# Patient Record
Sex: FEMALE | Race: WHITE | NOT HISPANIC OR LATINO | Employment: UNEMPLOYED | URBAN - METROPOLITAN AREA
[De-identification: names, ages, dates, MRNs, and addresses within clinical notes are randomized per-mention and may not be internally consistent; named-entity substitution may affect disease eponyms.]

---

## 2023-01-05 DIAGNOSIS — Z78.0 MENOPAUSE: Primary | ICD-10-CM

## 2023-01-09 DIAGNOSIS — Z12.31 ENCOUNTER FOR SCREENING MAMMOGRAM FOR MALIGNANT NEOPLASM OF BREAST: Primary | ICD-10-CM

## 2023-01-24 ENCOUNTER — HOSPITAL ENCOUNTER (OUTPATIENT)
Dept: RADIOLOGY | Facility: HOSPITAL | Age: 63
Discharge: HOME | End: 2023-01-24
Attending: SPECIALIST
Payer: COMMERCIAL

## 2023-01-24 DIAGNOSIS — Z12.31 ENCOUNTER FOR SCREENING MAMMOGRAM FOR MALIGNANT NEOPLASM OF BREAST: ICD-10-CM

## 2023-01-24 PROCEDURE — 77067 SCR MAMMO BI INCL CAD: CPT | Mod: TC,PO

## 2023-11-07 DIAGNOSIS — K22.11 ESOPHAGEAL ULCER WITH BLEEDING: ICD-10-CM

## 2023-11-07 DIAGNOSIS — K22.4 ESOPHAGEAL DYSMOTILITY: Primary | ICD-10-CM

## 2023-12-26 ENCOUNTER — HOSPITAL ENCOUNTER (OUTPATIENT)
Dept: RADIOLOGY | Facility: HOSPITAL | Age: 63
Discharge: HOME | End: 2023-12-26
Attending: INTERNAL MEDICINE
Payer: COMMERCIAL

## 2023-12-26 DIAGNOSIS — K22.4 ESOPHAGEAL DYSMOTILITY: ICD-10-CM

## 2023-12-26 DIAGNOSIS — K22.11 ESOPHAGEAL ULCER WITH BLEEDING: ICD-10-CM

## 2023-12-26 PROCEDURE — 74220 X-RAY XM ESOPHAGUS 1CNTRST: CPT | Mod: TC

## 2023-12-26 PROCEDURE — 74220 FL ESOPHAGRAM WITH BARIUM TABLET: ICD-10-PCS | Mod: 26,,, | Performed by: RADIOLOGY

## 2023-12-26 PROCEDURE — 74220 X-RAY XM ESOPHAGUS 1CNTRST: CPT | Mod: 26,,, | Performed by: RADIOLOGY

## 2023-12-26 PROCEDURE — 25500020 PHARM REV CODE 255

## 2023-12-26 PROCEDURE — A9698 NON-RAD CONTRAST MATERIALNOC: HCPCS

## 2023-12-26 RX ADMIN — BARIUM SULFATE 355 ML: 0.6 SUSPENSION ORAL at 11:12

## 2024-02-25 ENCOUNTER — OFFICE VISIT (OUTPATIENT)
Dept: URGENT CARE | Facility: CLINIC | Age: 64
End: 2024-02-25
Payer: COMMERCIAL

## 2024-02-25 ENCOUNTER — HOSPITAL ENCOUNTER (INPATIENT)
Facility: HOSPITAL | Age: 64
LOS: 1 days | Discharge: HOME | DRG: 177 | End: 2024-02-28
Attending: EMERGENCY MEDICINE | Admitting: STUDENT IN AN ORGANIZED HEALTH CARE EDUCATION/TRAINING PROGRAM
Payer: COMMERCIAL

## 2024-02-25 VITALS
BODY MASS INDEX: 28.03 KG/M2 | WEIGHT: 174.38 LBS | SYSTOLIC BLOOD PRESSURE: 157 MMHG | RESPIRATION RATE: 22 BRPM | DIASTOLIC BLOOD PRESSURE: 86 MMHG | TEMPERATURE: 98 F | HEART RATE: 94 BPM | OXYGEN SATURATION: 92 % | HEIGHT: 66 IN

## 2024-02-25 DIAGNOSIS — J96.01 ACUTE RESPIRATORY FAILURE WITH HYPOXIA: Primary | ICD-10-CM

## 2024-02-25 DIAGNOSIS — U07.1 COVID-19: ICD-10-CM

## 2024-02-25 DIAGNOSIS — R09.81 NASAL CONGESTION: ICD-10-CM

## 2024-02-25 DIAGNOSIS — U07.1 COVID-19 VIRUS INFECTION: ICD-10-CM

## 2024-02-25 DIAGNOSIS — R05.9 COUGH, UNSPECIFIED TYPE: Primary | ICD-10-CM

## 2024-02-25 DIAGNOSIS — Z87.09 HISTORY OF ASTHMA: ICD-10-CM

## 2024-02-25 DIAGNOSIS — R06.02 SOB (SHORTNESS OF BREATH): ICD-10-CM

## 2024-02-25 PROBLEM — Z90.3 H/O GASTRIC SLEEVE: Status: ACTIVE | Noted: 2023-10-23

## 2024-02-25 PROBLEM — J42 UNSPECIFIED CHRONIC BRONCHITIS: Status: ACTIVE | Noted: 2024-02-25

## 2024-02-25 PROBLEM — K21.9 GASTROESOPHAGEAL REFLUX DISEASE WITHOUT ESOPHAGITIS: Status: ACTIVE | Noted: 2023-10-23

## 2024-02-25 PROBLEM — J45.20 MILD INTERMITTENT ASTHMA WITHOUT COMPLICATION: Status: ACTIVE | Noted: 2023-10-23

## 2024-02-25 PROBLEM — E87.6 HYPOKALEMIA: Status: ACTIVE | Noted: 2024-02-25

## 2024-02-25 LAB
25(OH)D3+25(OH)D2 SERPL-MCNC: 39 NG/ML (ref 30–96)
ALBUMIN SERPL BCP-MCNC: 4.3 G/DL (ref 3.5–5.2)
ALP SERPL-CCNC: 77 U/L (ref 55–135)
ALT SERPL W/O P-5'-P-CCNC: 27 U/L (ref 10–44)
ANION GAP SERPL CALC-SCNC: 14 MMOL/L (ref 8–16)
APTT PPP: 28.8 SEC (ref 21–32)
AST SERPL-CCNC: 18 U/L (ref 10–40)
BACTERIA #/AREA URNS HPF: NORMAL /HPF
BASOPHILS # BLD AUTO: 0.07 K/UL (ref 0–0.2)
BASOPHILS NFR BLD: 0.5 % (ref 0–1.9)
BILIRUB SERPL-MCNC: 0.5 MG/DL (ref 0.1–1)
BILIRUB UR QL STRIP: NEGATIVE
BNP SERPL-MCNC: 30 PG/ML (ref 0–99)
BUN SERPL-MCNC: 6 MG/DL (ref 8–23)
CALCIUM SERPL-MCNC: 10.1 MG/DL (ref 8.7–10.5)
CHLORIDE SERPL-SCNC: 107 MMOL/L (ref 95–110)
CK SERPL-CCNC: 147 U/L (ref 20–180)
CLARITY UR: CLEAR
CO2 SERPL-SCNC: 22 MMOL/L (ref 23–29)
COLOR UR: YELLOW
CREAT SERPL-MCNC: 0.8 MG/DL (ref 0.5–1.4)
CRP SERPL-MCNC: 9 MG/L (ref 0–8.2)
D DIMER PPP IA.FEU-MCNC: 0.25 MG/L FEU
DIFFERENTIAL METHOD BLD: ABNORMAL
EOSINOPHIL # BLD AUTO: 0.1 K/UL (ref 0–0.5)
EOSINOPHIL NFR BLD: 0.8 % (ref 0–8)
ERYTHROCYTE [DISTWIDTH] IN BLOOD BY AUTOMATED COUNT: 13.2 % (ref 11.5–14.5)
ERYTHROCYTE [SEDIMENTATION RATE] IN BLOOD BY WESTERGREN METHOD: 5 MM/HR (ref 0–20)
EST. GFR  (NO RACE VARIABLE): >60 ML/MIN/1.73 M^2
FERRITIN SERPL-MCNC: 10 NG/ML (ref 20–300)
GLUCOSE SERPL-MCNC: 101 MG/DL (ref 70–110)
GLUCOSE UR QL STRIP: NEGATIVE
HCT VFR BLD AUTO: 46.1 % (ref 37–48.5)
HGB BLD-MCNC: 15.3 G/DL (ref 12–16)
HGB UR QL STRIP: ABNORMAL
IMM GRANULOCYTES # BLD AUTO: 0.04 K/UL (ref 0–0.04)
IMM GRANULOCYTES NFR BLD AUTO: 0.3 % (ref 0–0.5)
INFLUENZA A, MOLECULAR: NEGATIVE
INFLUENZA B, MOLECULAR: NEGATIVE
INR PPP: 1 (ref 0.8–1.2)
KETONES UR QL STRIP: NEGATIVE
LACTATE SERPL-SCNC: 2 MMOL/L (ref 0.5–2.2)
LDH SERPL L TO P-CCNC: 379 U/L (ref 110–260)
LEUKOCYTE ESTERASE UR QL STRIP: ABNORMAL
LYMPHOCYTES # BLD AUTO: 1.2 K/UL (ref 1–4.8)
LYMPHOCYTES NFR BLD: 8.4 % (ref 18–48)
MCH RBC QN AUTO: 31.2 PG (ref 27–31)
MCHC RBC AUTO-ENTMCNC: 33.2 G/DL (ref 32–36)
MCV RBC AUTO: 94 FL (ref 82–98)
MICROSCOPIC COMMENT: NORMAL
MONOCYTES # BLD AUTO: 0.9 K/UL (ref 0.3–1)
MONOCYTES NFR BLD: 6.6 % (ref 4–15)
NEUTROPHILS # BLD AUTO: 11.8 K/UL (ref 1.8–7.7)
NEUTROPHILS NFR BLD: 83.4 % (ref 38–73)
NITRITE UR QL STRIP: NEGATIVE
NRBC BLD-RTO: 0 /100 WBC
PH UR STRIP: 7 [PH] (ref 5–8)
PLATELET # BLD AUTO: 339 K/UL (ref 150–450)
PMV BLD AUTO: 9.9 FL (ref 9.2–12.9)
POTASSIUM SERPL-SCNC: 3.2 MMOL/L (ref 3.5–5.1)
PROT SERPL-MCNC: 7.7 G/DL (ref 6–8.4)
PROT UR QL STRIP: NEGATIVE
PROTHROMBIN TIME: 10.4 SEC (ref 9–12.5)
RBC # BLD AUTO: 4.9 M/UL (ref 4–5.4)
RBC #/AREA URNS HPF: 2 /HPF (ref 0–4)
SARS-COV-2 RDRP RESP QL NAA+PROBE: NEGATIVE
SODIUM SERPL-SCNC: 143 MMOL/L (ref 136–145)
SP GR UR STRIP: 1.01 (ref 1–1.03)
SPECIMEN SOURCE: NORMAL
SQUAMOUS #/AREA URNS HPF: 3 /HPF
TROPONIN I SERPL DL<=0.01 NG/ML-MCNC: 0.01 NG/ML (ref 0–0.03)
URN SPEC COLLECT METH UR: ABNORMAL
UROBILINOGEN UR STRIP-ACNC: NEGATIVE EU/DL
WBC # BLD AUTO: 14.19 K/UL (ref 3.9–12.7)
WBC #/AREA URNS HPF: 3 /HPF (ref 0–5)

## 2024-02-25 PROCEDURE — 87502 INFLUENZA DNA AMP PROBE: CPT

## 2024-02-25 PROCEDURE — 84484 ASSAY OF TROPONIN QUANT: CPT | Performed by: NURSE PRACTITIONER

## 2024-02-25 PROCEDURE — 83880 ASSAY OF NATRIURETIC PEPTIDE: CPT

## 2024-02-25 PROCEDURE — U0002 COVID-19 LAB TEST NON-CDC: HCPCS | Performed by: NURSE PRACTITIONER

## 2024-02-25 PROCEDURE — 96375 TX/PRO/DX INJ NEW DRUG ADDON: CPT

## 2024-02-25 PROCEDURE — 36415 COLL VENOUS BLD VENIPUNCTURE: CPT

## 2024-02-25 PROCEDURE — 82306 VITAMIN D 25 HYDROXY: CPT

## 2024-02-25 PROCEDURE — 93005 ELECTROCARDIOGRAM TRACING: CPT

## 2024-02-25 PROCEDURE — 99900031 HC PATIENT EDUCATION (STAT)

## 2024-02-25 PROCEDURE — 25000003 PHARM REV CODE 250: Performed by: NURSE PRACTITIONER

## 2024-02-25 PROCEDURE — 85730 THROMBOPLASTIN TIME PARTIAL: CPT

## 2024-02-25 PROCEDURE — 36415 COLL VENOUS BLD VENIPUNCTURE: CPT | Performed by: NURSE PRACTITIONER

## 2024-02-25 PROCEDURE — 63600175 PHARM REV CODE 636 W HCPCS: Mod: JZ,TB

## 2024-02-25 PROCEDURE — 82550 ASSAY OF CK (CPK): CPT | Performed by: NURSE PRACTITIONER

## 2024-02-25 PROCEDURE — 94640 AIRWAY INHALATION TREATMENT: CPT | Mod: XB

## 2024-02-25 PROCEDURE — 27000221 HC OXYGEN, UP TO 24 HOURS

## 2024-02-25 PROCEDURE — 25000003 PHARM REV CODE 250

## 2024-02-25 PROCEDURE — 96365 THER/PROPH/DIAG IV INF INIT: CPT

## 2024-02-25 PROCEDURE — 94640 AIRWAY INHALATION TREATMENT: CPT

## 2024-02-25 PROCEDURE — 80053 COMPREHEN METABOLIC PANEL: CPT | Performed by: NURSE PRACTITIONER

## 2024-02-25 PROCEDURE — 85379 FIBRIN DEGRADATION QUANT: CPT

## 2024-02-25 PROCEDURE — 93010 ELECTROCARDIOGRAM REPORT: CPT | Mod: ,,, | Performed by: GENERAL PRACTICE

## 2024-02-25 PROCEDURE — 85025 COMPLETE CBC W/AUTO DIFF WBC: CPT | Performed by: NURSE PRACTITIONER

## 2024-02-25 PROCEDURE — 25000242 PHARM REV CODE 250 ALT 637 W/ HCPCS

## 2024-02-25 PROCEDURE — 82728 ASSAY OF FERRITIN: CPT | Performed by: NURSE PRACTITIONER

## 2024-02-25 PROCEDURE — G0378 HOSPITAL OBSERVATION PER HR: HCPCS

## 2024-02-25 PROCEDURE — 99204 OFFICE O/P NEW MOD 45 MIN: CPT | Mod: S$GLB,,, | Performed by: NURSE PRACTITIONER

## 2024-02-25 PROCEDURE — 85610 PROTHROMBIN TIME: CPT

## 2024-02-25 PROCEDURE — 85651 RBC SED RATE NONAUTOMATED: CPT

## 2024-02-25 PROCEDURE — 99285 EMERGENCY DEPT VISIT HI MDM: CPT | Mod: 25

## 2024-02-25 PROCEDURE — 63600175 PHARM REV CODE 636 W HCPCS: Performed by: NURSE PRACTITIONER

## 2024-02-25 PROCEDURE — 83605 ASSAY OF LACTIC ACID: CPT | Performed by: NURSE PRACTITIONER

## 2024-02-25 PROCEDURE — 25000242 PHARM REV CODE 250 ALT 637 W/ HCPCS: Performed by: EMERGENCY MEDICINE

## 2024-02-25 PROCEDURE — 94761 N-INVAS EAR/PLS OXIMETRY MLT: CPT

## 2024-02-25 PROCEDURE — 81000 URINALYSIS NONAUTO W/SCOPE: CPT

## 2024-02-25 PROCEDURE — 86140 C-REACTIVE PROTEIN: CPT | Performed by: NURSE PRACTITIONER

## 2024-02-25 PROCEDURE — 83615 LACTATE (LD) (LDH) ENZYME: CPT | Performed by: NURSE PRACTITIONER

## 2024-02-25 RX ORDER — KETOROLAC TROMETHAMINE 30 MG/ML
15 INJECTION, SOLUTION INTRAMUSCULAR; INTRAVENOUS EVERY 6 HOURS PRN
Status: DISCONTINUED | OUTPATIENT
Start: 2024-02-26 | End: 2024-02-28 | Stop reason: HOSPADM

## 2024-02-25 RX ORDER — DEXTROMETHORPHAN HBR, PHENYLEPHRINE HCL, PYRILAMINE MALEATE 7.5; 5; 12.5 MG/5ML; MG/5ML; MG/5ML
7 SYRUP ORAL 3 TIMES DAILY PRN
COMMUNITY
Start: 2024-02-18

## 2024-02-25 RX ORDER — GLUCAGON 1 MG
1 KIT INJECTION
Status: DISCONTINUED | OUTPATIENT
Start: 2024-02-25 | End: 2024-02-28 | Stop reason: HOSPADM

## 2024-02-25 RX ORDER — PREDNISONE 20 MG/1
60 TABLET ORAL
Status: DISCONTINUED | OUTPATIENT
Start: 2024-02-25 | End: 2024-02-25

## 2024-02-25 RX ORDER — ACETAMINOPHEN 325 MG/1
650 TABLET ORAL EVERY 4 HOURS PRN
Status: DISCONTINUED | OUTPATIENT
Start: 2024-02-25 | End: 2024-02-28 | Stop reason: HOSPADM

## 2024-02-25 RX ORDER — ENOXAPARIN SODIUM 100 MG/ML
40 INJECTION SUBCUTANEOUS EVERY 24 HOURS
Status: DISCONTINUED | OUTPATIENT
Start: 2024-02-26 | End: 2024-02-28 | Stop reason: HOSPADM

## 2024-02-25 RX ORDER — OLOPATADINE HYDROCHLORIDE AND MOMETASONE FUROATE 25; 665 UG/1; UG/1
2 SPRAY, METERED NASAL 2 TIMES DAILY
COMMUNITY
Start: 2024-02-09

## 2024-02-25 RX ORDER — SODIUM CHLORIDE 0.9 % (FLUSH) 0.9 %
10 SYRINGE (ML) INJECTION
Status: DISCONTINUED | OUTPATIENT
Start: 2024-02-25 | End: 2024-02-28 | Stop reason: HOSPADM

## 2024-02-25 RX ORDER — IPRATROPIUM BROMIDE AND ALBUTEROL SULFATE 2.5; .5 MG/3ML; MG/3ML
3 SOLUTION RESPIRATORY (INHALATION) EVERY 6 HOURS
Status: DISCONTINUED | OUTPATIENT
Start: 2024-02-26 | End: 2024-02-28 | Stop reason: HOSPADM

## 2024-02-25 RX ORDER — IPRATROPIUM BROMIDE AND ALBUTEROL SULFATE 2.5; .5 MG/3ML; MG/3ML
3 SOLUTION RESPIRATORY (INHALATION)
Status: DISCONTINUED | OUTPATIENT
Start: 2024-02-25 | End: 2024-02-25

## 2024-02-25 RX ORDER — POTASSIUM CHLORIDE 20 MEQ/1
20 TABLET, EXTENDED RELEASE ORAL ONCE
Status: COMPLETED | OUTPATIENT
Start: 2024-02-25 | End: 2024-02-25

## 2024-02-25 RX ORDER — ASCORBIC ACID 500 MG
500 TABLET ORAL 2 TIMES DAILY
Status: DISCONTINUED | OUTPATIENT
Start: 2024-02-25 | End: 2024-02-28 | Stop reason: HOSPADM

## 2024-02-25 RX ORDER — ONDANSETRON HYDROCHLORIDE 2 MG/ML
8 INJECTION, SOLUTION INTRAVENOUS EVERY 8 HOURS PRN
Status: DISCONTINUED | OUTPATIENT
Start: 2024-02-25 | End: 2024-02-28 | Stop reason: HOSPADM

## 2024-02-25 RX ORDER — IBUPROFEN 200 MG
16 TABLET ORAL
Status: DISCONTINUED | OUTPATIENT
Start: 2024-02-25 | End: 2024-02-28 | Stop reason: HOSPADM

## 2024-02-25 RX ORDER — OMEPRAZOLE 40 MG/1
40 CAPSULE, DELAYED RELEASE ORAL EVERY MORNING
COMMUNITY

## 2024-02-25 RX ORDER — ZOLPIDEM TARTRATE 10 MG/1
1 TABLET ORAL NIGHTLY
COMMUNITY
Start: 2024-02-15

## 2024-02-25 RX ORDER — TALC
9 POWDER (GRAM) TOPICAL NIGHTLY PRN
Status: DISCONTINUED | OUTPATIENT
Start: 2024-02-25 | End: 2024-02-28 | Stop reason: HOSPADM

## 2024-02-25 RX ORDER — SUCRALFATE 1 G/1
1 TABLET ORAL 3 TIMES DAILY
COMMUNITY
Start: 2023-12-27

## 2024-02-25 RX ORDER — IBUPROFEN 200 MG
24 TABLET ORAL
Status: DISCONTINUED | OUTPATIENT
Start: 2024-02-25 | End: 2024-02-28 | Stop reason: HOSPADM

## 2024-02-25 RX ORDER — PANTOPRAZOLE SODIUM 40 MG/1
40 TABLET, DELAYED RELEASE ORAL DAILY
Status: DISCONTINUED | OUTPATIENT
Start: 2024-02-26 | End: 2024-02-28 | Stop reason: HOSPADM

## 2024-02-25 RX ORDER — SIMVASTATIN 20 MG/1
20 TABLET, FILM COATED ORAL NIGHTLY
COMMUNITY

## 2024-02-25 RX ORDER — GABAPENTIN 300 MG/1
300 CAPSULE ORAL 2 TIMES DAILY
Status: DISCONTINUED | OUTPATIENT
Start: 2024-02-25 | End: 2024-02-28 | Stop reason: HOSPADM

## 2024-02-25 RX ORDER — ALBUTEROL SULFATE 90 UG/1
2 AEROSOL, METERED RESPIRATORY (INHALATION) 4 TIMES DAILY PRN
COMMUNITY

## 2024-02-25 RX ORDER — NIRMATRELVIR AND RITONAVIR 300-100 MG
KIT ORAL
COMMUNITY
Start: 2024-02-22

## 2024-02-25 RX ORDER — GABAPENTIN 300 MG/1
300 CAPSULE ORAL 2 TIMES DAILY
COMMUNITY

## 2024-02-25 RX ORDER — ALBUTEROL SULFATE 0.83 MG/ML
2.5 SOLUTION RESPIRATORY (INHALATION) 4 TIMES DAILY
Status: ON HOLD | COMMUNITY
Start: 2024-01-12 | End: 2024-02-28

## 2024-02-25 RX ORDER — IPRATROPIUM BROMIDE AND ALBUTEROL SULFATE 2.5; .5 MG/3ML; MG/3ML
3 SOLUTION RESPIRATORY (INHALATION)
Status: COMPLETED | OUTPATIENT
Start: 2024-02-25 | End: 2024-02-25

## 2024-02-25 RX ADMIN — REMDESIVIR 200 MG: 100 INJECTION, POWDER, LYOPHILIZED, FOR SOLUTION INTRAVENOUS at 07:02

## 2024-02-25 RX ADMIN — KETOROLAC TROMETHAMINE 15 MG: 30 INJECTION INTRAMUSCULAR; INTRAVENOUS at 11:02

## 2024-02-25 RX ADMIN — IPRATROPIUM BROMIDE AND ALBUTEROL SULFATE 3 ML: .5; 2.5 SOLUTION RESPIRATORY (INHALATION) at 02:02

## 2024-02-25 RX ADMIN — POTASSIUM BICARBONATE 20 MEQ: 391 TABLET, EFFERVESCENT ORAL at 05:02

## 2024-02-25 RX ADMIN — POTASSIUM CHLORIDE 20 MEQ: 1500 TABLET, EXTENDED RELEASE ORAL at 08:02

## 2024-02-25 RX ADMIN — ACETAMINOPHEN 650 MG: 325 TABLET ORAL at 07:02

## 2024-02-25 RX ADMIN — OXYCODONE HYDROCHLORIDE AND ACETAMINOPHEN 500 MG: 500 TABLET ORAL at 08:02

## 2024-02-25 RX ADMIN — METHYLPREDNISOLONE SODIUM SUCCINATE 80 MG: 40 INJECTION, POWDER, FOR SOLUTION INTRAMUSCULAR; INTRAVENOUS at 03:02

## 2024-02-25 RX ADMIN — GABAPENTIN 300 MG: 300 CAPSULE ORAL at 11:02

## 2024-02-25 RX ADMIN — IPRATROPIUM BROMIDE AND ALBUTEROL SULFATE 3 ML: .5; 2.5 SOLUTION RESPIRATORY (INHALATION) at 05:02

## 2024-02-25 NOTE — ED PROVIDER NOTES
Encounter Date: 2/25/2024       History     Chief Complaint   Patient presents with    Shortness of Breath    Cough     Patient is a 63 y.o. female who presents to the ED 02/25/2024 who underwent emergent evaluation for SOB that started today. Pt tested positive for COVID 19 on Thursday of this week and started on paxlovid. She states her symptoms started Sunday 1 week ago and she went to urgent care where they gave her antibiotics and steroids but she did not take them bc she woke up feeling better the next day. She then traveled to another state to be  with her mother in the hospital who was dying of covid 19. When she came back she tested positive               Review of patient's allergies indicates:   Allergen Reactions    Sulfa (sulfonamide antibiotics) Rash     UNSURE    Sulfamethoxazole-trimethoprim Rash     UNSURE     Past Medical History:   Diagnosis Date    Asthma     GERD (gastroesophageal reflux disease)     Mixed hyperlipidemia      Past Surgical History:   Procedure Laterality Date    BARIATRIC SURGERY      gastric sleeve     History reviewed. No pertinent family history.  Social History     Tobacco Use    Smoking status: Unknown   Substance Use Topics    Alcohol use: Yes     Comment: social    Drug use: Never     Review of Systems    Physical Exam     Initial Vitals [02/25/24 1247]   BP Pulse Resp Temp SpO2   124/75 95 20 97.6 °F (36.4 °C) 96 %      MAP       --         Physical Exam    ED Course   Procedures  Labs Reviewed   CBC W/ AUTO DIFFERENTIAL - Abnormal; Notable for the following components:       Result Value    WBC 14.19 (*)     MCH 31.2 (*)     Gran # (ANC) 11.8 (*)     Gran % 83.4 (*)     Lymph % 8.4 (*)     All other components within normal limits   COMPREHENSIVE METABOLIC PANEL - Abnormal; Notable for the following components:    Potassium 3.2 (*)     CO2 22 (*)     BUN 6 (*)     All other components within normal limits   C-REACTIVE PROTEIN - Abnormal; Notable for the following  components:    CRP 9.0 (*)     All other components within normal limits   FERRITIN - Abnormal; Notable for the following components:    Ferritin 10 (*)     All other components within normal limits   LACTATE DEHYDROGENASE - Abnormal; Notable for the following components:     (*)     All other components within normal limits   URINALYSIS, REFLEX TO URINE CULTURE - Abnormal; Notable for the following components:    Occult Blood UA Trace (*)     Leukocytes, UA 1+ (*)     All other components within normal limits    Narrative:     Specimen Source->Urine   INFLUENZA A & B BY MOLECULAR   CK   LACTIC ACID, PLASMA   TROPONIN I   SARS-COV-2 RNA AMPLIFICATION, QUAL   VITAMIN D   PROTIME-INR   APTT   SEDIMENTATION RATE   D DIMER, QUANTITATIVE   B-TYPE NATRIURETIC PEPTIDE   URINALYSIS MICROSCOPIC    Narrative:     Specimen Source->Urine     EKG Readings: (Independently Interpreted)   Initial Reading: No STEMI. Rhythm: Normal Sinus Rhythm. Heart Rate: 92. Ectopy: No Ectopy. Conduction: Normal. ST Segments: Normal ST Segments. T Waves: Normal. Clinical Impression: Normal Sinus Rhythm       Imaging Results              X-Ray Chest PA And Lateral (Final result)  Result time 02/25/24 13:20:33      Final result by Corinna Angel MD (02/25/24 13:20:33)                   Impression:      No acute abnormality.      Electronically signed by: Corinna Angel  Date:    02/25/2024  Time:    13:20               Narrative:    EXAMINATION:  XR CHEST PA AND LATERAL    CLINICAL HISTORY:  Asthma;    TECHNIQUE:  PA and lateral views of the chest were performed.    COMPARISON:  None    FINDINGS:  The lungs are clear, with normal appearance of pulmonary vasculature and no pleural effusion or pneumothorax.    The cardiac silhouette is normal in size. The hilar and mediastinal contours are unremarkable.    Bones are intact.                                       Medications   sodium chloride 0.9% flush 10 mL (has no administration in  time range)   remdesivir 100 mg in sodium chloride 0.9% 250 mL infusion (has no administration in time range)   glucose chewable tablet 16 g (has no administration in time range)   glucose chewable tablet 24 g (has no administration in time range)   glucagon (human recombinant) injection 1 mg (has no administration in time range)   ascorbic acid (vitamin C) tablet 500 mg (has no administration in time range)   multivitamin tablet (has no administration in time range)   dexAMETHasone tablet 6 mg (has no administration in time range)   ondansetron injection 8 mg (has no administration in time range)   acetaminophen tablet 650 mg (650 mg Oral Given 2/25/24 1921)   enoxaparin injection 40 mg (has no administration in time range)   albuterol-ipratropium 2.5 mg-0.5 mg/3 mL nebulizer solution 3 mL (3 mLs Nebulization Given 2/25/24 1745)   melatonin tablet 9 mg (has no administration in time range)   dextrose 10% bolus 125 mL 125 mL (has no administration in time range)   dextrose 10% bolus 250 mL 250 mL (has no administration in time range)   potassium chloride SA CR tablet 20 mEq (has no administration in time range)   methylPREDNISolone sodium succinate injection 80 mg (80 mg Intravenous Given 2/25/24 1530)   albuterol-ipratropium 2.5 mg-0.5 mg/3 mL nebulizer solution 3 mL (3 mLs Nebulization Given 2/25/24 1400)   potassium bicarbonate disintegrating tablet 20 mEq (20 mEq Oral Given 2/25/24 1704)   remdesivir 200 mg in sodium chloride 0.9% 250 mL infusion (0 mg Intravenous Stopped 2/25/24 1941)     Medical Decision Making  Amount and/or Complexity of Data Reviewed  Labs: ordered.  Radiology:  Decision-making details documented in ED Course.    Risk  Prescription drug management.         APC / Resident Notes:   Patient is a 63 y.o. female who presents to the ED 02/25/2024 who underwent emergent evaluation for cough and shortness of breath.  Patient has a positive COVID-19 test this week.  X-ray unremarkable.  Oxygen  saturation 91% on room air with ambulation.  Patient recovers to 95-96% on room air at rest.  Discussed admission for observation versus home with strict return precautions.  Patient would prefer to be admitted for observation.  She is given IV steroids, and nebulizer treatments and admitted to hospital medicine team for further evaluation and treatment of her COVID-19 bronchitis.  No evidence of any secondary bacterial infection requiring antibiotics at this time.  Labs unremarkable.  I do not think other emergent process such as PE or ACS.  Patient does not appear to be septic or toxic.  Case discussed with hospital medicine team is accepting of this admission. Case also discussed with Dr. Eric who is agreeable to plan of care.         ED Course as of 02/25/24 2030   Sun Feb 25, 2024   1350 X-Ray Chest PA And Lateral [JK]      ED Course User Index  [JK] Selam Braun NP               Medical Decision Making:   Differential Diagnosis:   COVID 19  Pneumonia  Bronchitis              Clinical Impression:  Final diagnoses:  [U07.1] COVID-19          ED Disposition Condition    Observation                 Selam Braun NP  02/25/24 2030

## 2024-02-25 NOTE — PROGRESS NOTES
"Subjective:      Patient ID: Saloni Jeff is a 63 y.o. female.    Vitals:  height is 5' 6" (1.676 m) and weight is 79.1 kg (174 lb 6.4 oz). Her oral temperature is 97.9 °F (36.6 °C). Her blood pressure is 157/86 (abnormal) and her pulse is 94. Her respiration is 22 (abnormal) and oxygen saturation is 92% (abnormal).     Chief Complaint: Cough (Pt states that her symptoms started 1 day ago. Patient states that 7 days ago she tested positive for covid, and on 2 days ago she tested negative. Patient states that her current symptoms are the following: cough w/ mucus, breathing/ wheezing, chest heaviness, headache, nasal congestion. Patient denies any other symptoms. )    This is a 63 y.o. female who presents today with a chief complaint of Cough: Pt states that her symptoms started 1 day ago. Patient states that 7 days ago she tested positive for covid, and on 2 days ago she tested negative. Patient states that her current symptoms are the following: cough w/ mucus, breathing/ wheezing, chest heaviness, headache, nasal congestion. Patient denies any other symptoms.   Patient presents with:  Cough: Pt states that her symptoms started 1 day ago. Patient states that 7 days ago she tested positive for covid, and on 2 days ago she tested negative. Patient states that her current symptoms are the following: cough w/ mucus, breathing/ wheezing, chest heaviness, headache, nasal congestion. Patient denies any other symptoms.     Significant worsening of SOB/wheezing during the night.  States she had significant difficulty sleeping due to SOB, states she has pox at home and during the night was in the mid to tim 80's prior to neb tx. Has been doing albuterol neb tx's every 2.5 hrsjust to maintain.  Last neb tx approx 1 hr prior to arrival. Is on day 3 of paxlovid.          Cough  Associated symptoms include shortness of breath and wheezing.     Constitution: Positive for fatigue and generalized weakness.   HENT:  Positive " for congestion.    Respiratory:  Positive for chest tightness, cough, shortness of breath, wheezing and asthma.    Allergic/Immunologic: Positive for asthma.   Neurological:  Positive for dizziness.      Objective:     Physical Exam   Constitutional: She is oriented to person, place, and time.  Non-toxic appearance. She appears ill. No distress.   HENT:   Nose: Nose normal.   Mouth/Throat: Mucous membranes are moist.   Eyes: Conjunctivae are normal.   Cardiovascular: Normal rate.   Pulmonary/Chest: Accessory muscle usage (mild) present. Tachypnea (mild) noted. No respiratory distress. She has decreased breath sounds in the right middle field and the right lower field. She has wheezes in the right upper field, the right middle field, the right lower field, the left upper field, the left middle field and the left lower field. She exhibits no tenderness.   Wheezing inspiratory and expiratory throughout with decreased breath sounds right.  Having mild difficulty completing full sentences. She is able to complete without pause, but appears mildly dyspneic.          Comments: Wheezing inspiratory and expiratory throughout with decreased breath sounds right.  Having mild difficulty completing full sentences. She is able to complete without pause, but appears mildly dyspneic.     Abdominal: Normal appearance.   Neurological: no focal deficit. She is alert and oriented to person, place, and time.   Skin: Skin is warm, dry and not diaphoretic. Capillary refill takes 2 to 3 seconds.   Psychiatric: Her behavior is normal. Mood normal.   Nursing note and vitals reviewed.      Assessment:     1. Cough, unspecified type    2. Nasal congestion    3. History of asthma    4. COVID-19 virus infection    5. SOB (shortness of breath)        Plan:       Cough, unspecified type  -     Cancel: SARS Coronavirus 2 Antigen, POCT Manual Read  -     Cancel: POCT Influenza A/B Rapid Antigen    Nasal congestion  -     Cancel: SARS Coronavirus 2  Antigen, POCT Manual Read  -     Cancel: POCT Influenza A/B Rapid Antigen    History of asthma    COVID-19 virus infection    SOB (shortness of breath)                Advised to go to ER for further eval and mgmt due to fairly rapidly worsening SOB.  Declined EMS transfer,  came to clinic to drive her to ER.  Form signed, see MM

## 2024-02-25 NOTE — ED NOTES
Assumed care:  Saloni Jeff is awake, alert and oriented x 3, skin warm and dry, in NAD with family at bedside.  Patient CO SOB and cough that started yesterday.  States that she had covid a week and a half ago but was feeling better.      Patient identifiers for Saloni Jeff checked and correct.  LOC:  Saloni Jeff is awake, alert, and aware of environment with an appropriate affect. She is oriented x 3 and speaking appropriately.  APPEARANCE:  She is resting comfortably and in no acute distress. She is clean and well groomed, patient's clothing is properly fastened.  SKIN:  The skin is warm and dry. She has normal skin turgor and moist mucus membranes. Skin is intact; no bruising or breakdown noted.  MUSCULOSKELETAL:  She is moving all extremities well, no obvious deformities noted. Pulses intact.   RESPIRATORY:  Airway is open and patent. Respirations are spontaneous and non-labored with normal effort and rate.  SOB, cough, wheeze  CARDIAC:  She has a normal rate and rhythm. No peripheral edema noted. Capillary refill < 3 seconds.  ABDOMEN:  No distention noted.  Soft and non-tender upon palpation.  NEUROLOGICAL:  PERRL. Facial expression is symmetrical. Hand grasps are equal bilaterally. Normal sensation in all extremities when touched with finger.  Allergies reported:    Review of patient's allergies indicates:   Allergen Reactions    Sulfa (sulfonamide antibiotics) Rash     UNSURE    Sulfamethoxazole-trimethoprim Rash     UNSURE

## 2024-02-25 NOTE — CARE UPDATE
02/25/24 1400   Patient Assessment/Suction   Level of Consciousness (AVPU) alert   Respiratory Effort Normal;Short of breath   Expansion/Accessory Muscles/Retractions no use of accessory muscles;no retractions;expansion symmetric   All Lung Fields Breath Sounds wheezes, expiratory;wheezes, inspiratory;Anterior:;Lateral:   Rhythm/Pattern, Respiratory shortness of breath   Cough Frequency infrequent   Cough Type nonproductive;congested   PRE-TX-O2   Device (Oxygen Therapy) room air  (O2 tank @ bedside (as needed))   SpO2 96 %   Pulse Oximetry Type Continuous   $ Pulse Oximetry - Multiple Charge Pulse Oximetry - Multiple   Pulse 79   Resp 20   Positioning HOB elevated 45 degrees   Aerosol Therapy   $ Aerosol Therapy Charges Aerosol Treatment   Respiratory Treatment Status (SVN) given   Treatment Route (SVN) mask;oxygen   Patient Position (SVN) HOB elevated   Post Treatment Assessment (SVN) breath sounds improved;wheezing decreased   Signs of Intolerance (SVN) none   Education   $ Education Bronchodilator;15 min

## 2024-02-25 NOTE — PROGRESS NOTES
Automatic Inhaler to Nebulizer Interchange    ipratropium/albuterol (Combivent Respimat) 20 mcg/100 mcg-120 mcg/600 mcg changed to ipratropium/albuterol 0.5 mg/2.5 mg ED x 1  per [facility] Automatic Therapeutic Substitutions Protocol.    Please contact pharmacy at extension [phone] with any questions.     Thank you,   Romy Paul

## 2024-02-25 NOTE — FIRST PROVIDER EVALUATION
" Emergency Department TeleTriage Encounter Note      CHIEF COMPLAINT    Chief Complaint   Patient presents with    Shortness of Breath    Cough       VITAL SIGNS   Initial Vitals [02/25/24 1247]   BP Pulse Resp Temp SpO2   124/75 95 20 97.6 °F (36.4 °C) 96 %      MAP       --            ALLERGIES    Review of patient's allergies indicates:   Allergen Reactions    Sulfa (sulfonamide antibiotics) Rash     UNSURE    Sulfamethoxazole-trimethoprim Rash     UNSURE       PROVIDER TRIAGE NOTE  This is a teletriage evaluation of a 63 y.o. female presenting to the ED with c/o cough and SOB since yesterday.  Covid positive Thursday.  Pulse ox at 83.  Pt has used her inhaler and nebulizer at home with minimal relief.  Pt states chest feels "tight." .     PE: VSS.  Speaking in full sentences.      Plan: imaging    Limited physical exam via telehealth: The patient is awake, alert, answering questions appropriately and is not in respiratory distress. All ED beds are full at present; patient notified of this status.  Patient seen and medically screened by Nurse Practitioner via teletriage.      Initial orders will be placed and care will be transferred to an alternate provider when patient is roomed for a full evaluation. Any additional orders and the final disposition will be determined by that provider.         ORDERS  Labs Reviewed - No data to display    ED Orders (720h ago, onward)      Start Ordered     Status Ordering Provider    02/25/24 1255 02/25/24 1254  X-Ray Chest PA And Lateral  1 time imaging         Ordered SINDHU VERGARA              Virtual Visit Note: The provider triage portion of this emergency department evaluation and documentation was performed via [employer], a HIPAA-compliant telemedicine application, in concert with a tele-presenter in the room. A face to face patient evaluation with one of my colleagues will occur once the patient is placed in an emergency department room.      DISCLAIMER: This note " was prepared with [employer] voice recognition transcription software. Garbled syntax, mangled pronouns, and other bizarre constructions may be attributed to that software system.

## 2024-02-26 PROBLEM — E87.6 HYPOKALEMIA: Status: RESOLVED | Noted: 2024-02-25 | Resolved: 2024-02-26

## 2024-02-26 LAB
ALBUMIN SERPL BCP-MCNC: 3.9 G/DL (ref 3.5–5.2)
ALP SERPL-CCNC: 74 U/L (ref 55–135)
ALT SERPL W/O P-5'-P-CCNC: 24 U/L (ref 10–44)
ANION GAP SERPL CALC-SCNC: 10 MMOL/L (ref 8–16)
AST SERPL-CCNC: 16 U/L (ref 10–40)
BILIRUB SERPL-MCNC: 0.3 MG/DL (ref 0.1–1)
BUN SERPL-MCNC: 10 MG/DL (ref 8–23)
CALCIUM SERPL-MCNC: 10.6 MG/DL (ref 8.7–10.5)
CHLORIDE SERPL-SCNC: 108 MMOL/L (ref 95–110)
CO2 SERPL-SCNC: 23 MMOL/L (ref 23–29)
CREAT SERPL-MCNC: 0.9 MG/DL (ref 0.5–1.4)
CRP SERPL-MCNC: 38.2 MG/L (ref 0–8.2)
EST. GFR  (NO RACE VARIABLE): >60 ML/MIN/1.73 M^2
GLUCOSE SERPL-MCNC: 179 MG/DL (ref 70–110)
MAGNESIUM SERPL-MCNC: 2.1 MG/DL (ref 1.6–2.6)
PHOSPHATE SERPL-MCNC: 3.3 MG/DL (ref 2.7–4.5)
POTASSIUM SERPL-SCNC: 4.7 MMOL/L (ref 3.5–5.1)
PROT SERPL-MCNC: 7.2 G/DL (ref 6–8.4)
SODIUM SERPL-SCNC: 141 MMOL/L (ref 136–145)

## 2024-02-26 PROCEDURE — 96366 THER/PROPH/DIAG IV INF ADDON: CPT

## 2024-02-26 PROCEDURE — 94640 AIRWAY INHALATION TREATMENT: CPT | Mod: XB

## 2024-02-26 PROCEDURE — 25000003 PHARM REV CODE 250: Performed by: NURSE PRACTITIONER

## 2024-02-26 PROCEDURE — G0378 HOSPITAL OBSERVATION PER HR: HCPCS

## 2024-02-26 PROCEDURE — 99900035 HC TECH TIME PER 15 MIN (STAT)

## 2024-02-26 PROCEDURE — 83735 ASSAY OF MAGNESIUM: CPT

## 2024-02-26 PROCEDURE — 96372 THER/PROPH/DIAG INJ SC/IM: CPT

## 2024-02-26 PROCEDURE — XW033E5 INTRODUCTION OF REMDESIVIR ANTI-INFECTIVE INTO PERIPHERAL VEIN, PERCUTANEOUS APPROACH, NEW TECHNOLOGY GROUP 5: ICD-10-PCS | Performed by: STUDENT IN AN ORGANIZED HEALTH CARE EDUCATION/TRAINING PROGRAM

## 2024-02-26 PROCEDURE — 86140 C-REACTIVE PROTEIN: CPT

## 2024-02-26 PROCEDURE — 94761 N-INVAS EAR/PLS OXIMETRY MLT: CPT

## 2024-02-26 PROCEDURE — 84100 ASSAY OF PHOSPHORUS: CPT

## 2024-02-26 PROCEDURE — 25000242 PHARM REV CODE 250 ALT 637 W/ HCPCS: Performed by: STUDENT IN AN ORGANIZED HEALTH CARE EDUCATION/TRAINING PROGRAM

## 2024-02-26 PROCEDURE — 25000003 PHARM REV CODE 250

## 2024-02-26 PROCEDURE — 36415 COLL VENOUS BLD VENIPUNCTURE: CPT

## 2024-02-26 PROCEDURE — 63600175 PHARM REV CODE 636 W HCPCS

## 2024-02-26 PROCEDURE — 94640 AIRWAY INHALATION TREATMENT: CPT

## 2024-02-26 PROCEDURE — 80053 COMPREHEN METABOLIC PANEL: CPT

## 2024-02-26 PROCEDURE — 27000221 HC OXYGEN, UP TO 24 HOURS

## 2024-02-26 RX ORDER — HYDROCODONE BITARTRATE AND ACETAMINOPHEN 5; 325 MG/1; MG/1
1 TABLET ORAL EVERY 6 HOURS PRN
Status: DISCONTINUED | OUTPATIENT
Start: 2024-02-26 | End: 2024-02-28 | Stop reason: HOSPADM

## 2024-02-26 RX ADMIN — DEXAMETHASONE 6 MG: 2 TABLET ORAL at 10:02

## 2024-02-26 RX ADMIN — OXYCODONE HYDROCHLORIDE AND ACETAMINOPHEN 500 MG: 500 TABLET ORAL at 10:02

## 2024-02-26 RX ADMIN — OXYCODONE HYDROCHLORIDE AND ACETAMINOPHEN 500 MG: 500 TABLET ORAL at 08:02

## 2024-02-26 RX ADMIN — MELATONIN TAB 3 MG 9 MG: 3 TAB at 08:02

## 2024-02-26 RX ADMIN — HYDROCODONE BITARTRATE AND ACETAMINOPHEN 1 TABLET: 5; 325 TABLET ORAL at 11:02

## 2024-02-26 RX ADMIN — IPRATROPIUM BROMIDE AND ALBUTEROL SULFATE 3 ML: 2.5; .5 SOLUTION RESPIRATORY (INHALATION) at 12:02

## 2024-02-26 RX ADMIN — MULTIVITAMIN TABLET 1 TABLET: TABLET at 10:02

## 2024-02-26 RX ADMIN — GABAPENTIN 300 MG: 300 CAPSULE ORAL at 08:02

## 2024-02-26 RX ADMIN — REMDESIVIR 100 MG: 100 INJECTION, POWDER, LYOPHILIZED, FOR SOLUTION INTRAVENOUS at 11:02

## 2024-02-26 RX ADMIN — ENOXAPARIN SODIUM 40 MG: 40 INJECTION SUBCUTANEOUS at 04:02

## 2024-02-26 RX ADMIN — MELATONIN TAB 3 MG 9 MG: 3 TAB at 12:02

## 2024-02-26 RX ADMIN — IPRATROPIUM BROMIDE AND ALBUTEROL SULFATE 3 ML: 2.5; .5 SOLUTION RESPIRATORY (INHALATION) at 08:02

## 2024-02-26 NOTE — PLAN OF CARE
[facility]  Initial Discharge Assessment       Primary Care Provider: Cleo Cruz MD    Admission Diagnosis: COVID-19 [U07.1]    Admission Date: 2/25/2024  Expected Discharge Date: 2/28/2024    Transition of Care Barriers: None    Payor: BLUE CROSS BLUE SHIELD / Plan: [place] / Product Type: HMO /     Extended Emergency Contact Information  Primary Emergency Contact: Vinnie Jeff  Mobile Phone: [phone]  Relation: Spouse  Preferred language: English   needed? No    Discharge Plan A: Home with family  Discharge Plan B: Home with family      [id number] - [place] - [address] AT [facility] [address]  [address] [address]  Phone: [phone] Fax: [phone]      Initial Assessment (most recent)       Adult Discharge Assessment - 02/26/24 1555          Discharge Assessment    Assessment Type Discharge Planning Assessment     Confirmed/corrected address, phone number and insurance Yes     Confirmed Demographics Correct on Facesheet     Source of Information health record     Does patient/caregiver understand observation status Yes     Communicated NOE with patient/caregiver Date not available/Unable to determine     Reason For Admission COVID-19     People in Home spouse     Facility Arrived From: home     Do you expect to return to your current living situation? Yes     Do you have help at home or someone to help you manage your care at home? Yes     Who are your caregiver(s) and their phone number(s)? Vinnie Jeff (Spouse)  [phone]     Readmission within 30 days? No     Patient currently being followed by outpatient case management? No     Do you currently have service(s) that help you manage your care at home? No     Do you take prescription medications? Yes     Do you have prescription coverage? Yes     Coverage Payor:   BLUE CROSS BLUE SHIELD - [place] HMO -     Do you have any problems  affording any of your prescribed medications? No     Is the patient taking medications as prescribed? yes     How do you get to doctors appointments? family or friend will provide     Are you on dialysis? No     Do you take coumadin? No     Discharge Plan A Home with family     Discharge Plan B Home with family     DME Needed Upon Discharge  none     Transition of Care Barriers None

## 2024-02-26 NOTE — ASSESSMENT & PLAN NOTE
Patient with Hypoxic Respiratory failure which is Acute.  she is not on home oxygen. Supplemental oxygen was provided and noted-  pt placed on oxygen in emergency room  Patients O2 tank ran out, sats dropped to 88%, tank changed and O2 increased to 4 LNC, stats now 94%.      Electronically signed by Darling Lovell RN at 2/25/2024  8:11 PM    .   Signs/symptoms of respiratory failure include- tachypnea, increased work of breathing, and wheezing. Contributing diagnoses includes -  Covid-19 and chronic asthma  Labs and images were reviewed. Patient Has not had a recent ABG. Will treat underlying causes and adjust management of respiratory failure as follows- supplemental oxygen, duonebs, steroids  -D-dimer notably negative  -continue the COVID treatment and wean oxygen as able

## 2024-02-26 NOTE — SUBJECTIVE & OBJECTIVE
Medical History: HLD, GERD, Asthma, Iron deficiency anemia  Surgical History: Gastric Sleeve        Review of patient's allergies indicates:   Allergen Reactions    Sulfa (sulfonamide antibiotics) Rash     UNSURE    Sulfamethoxazole-trimethoprim Rash     UNSURE       No current facility-administered medications on file prior to encounter.     Current Outpatient Medications on File Prior to Encounter   Medication Sig    albuterol (PROVENTIL) 2.5 mg /3 mL (0.083 %) nebulizer solution Take 2.5 mg by nebulization 4 (four) times daily.    albuterol (PROVENTIL/VENTOLIN HFA) 90 mcg/actuation inhaler Inhale 2 puffs into the lungs 4 (four) times daily as needed.    gabapentin (NEURONTIN) 300 MG capsule Take 300 mg by mouth 2 (two) times daily.    olopatadine-mometasone (RYALTRIS) 665-25 mcg/spray Spry 2 sprays by Nasal route 2 (two) times a day.    omeprazole (PRILOSEC) 40 MG capsule Take 40 mg by mouth every morning.    PAXLOVID 300 mg (150 mg x 2)-100 mg copackaged tablets (EUA) Take by mouth.    POLYTUSSIN DM,PYRILAMINE, 12.5-5-7.5 mg/5 mL Liqd Take 7 mLs by mouth 3 (three) times daily as needed.    simvastatin (ZOCOR) 20 MG tablet Take 20 mg by mouth every evening.    sucralfate (CARAFATE) 1 gram tablet Take 1 g by mouth 3 (three) times daily.    zolpidem (AMBIEN) 10 mg Tab Take 1 tablet by mouth every evening.     Family History    None       Tobacco Use    Smoking status: Unknown    Smokeless tobacco: Not on file   Substance and Sexual Activity    Alcohol use: Yes     Comment: social    Drug use: Never    Sexual activity: Not on file     Review of Systems   Constitutional:  Positive for fatigue.   Respiratory:  Positive for shortness of breath and wheezing.    Cardiovascular:  Negative for chest pain, palpitations and leg swelling.   All other systems reviewed and are negative.    Objective:     Vital Signs (Most Recent):  Temp: 97.6 °F (36.4 °C) (02/25/24 1247)  Pulse: 83 (02/25/24 1800)  Resp: 19 (02/25/24 1800)  BP:  (!) 164/71 (02/25/24 1800)  SpO2: 97 % (02/25/24 1800) Vital Signs (24h Range):  Temp:  [97.6 °F (36.4 °C)-97.9 °F (36.6 °C)] 97.6 °F (36.4 °C)  Pulse:  [] 83  Resp:  [18-22] 19  SpO2:  [92 %-99 %] 97 %  BP: (124-178)/(69-86) 164/71     Weight: 78.9 kg (174 lb)  Body mass index is 28.08 kg/m².     Physical Exam  Vitals reviewed.   Constitutional:       General: She is not in acute distress.     Appearance: Normal appearance. She is not ill-appearing.   HENT:      Head: Normocephalic and atraumatic.      Mouth/Throat:      Mouth: Mucous membranes are dry.      Pharynx: Oropharynx is clear.   Eyes:      Extraocular Movements: Extraocular movements intact.      Pupils: Pupils are equal, round, and reactive to light.   Cardiovascular:      Rate and Rhythm: Regular rhythm. Tachycardia present.      Pulses: Normal pulses.      Heart sounds: Normal heart sounds.   Pulmonary:      Breath sounds: Examination of the right-middle field reveals wheezing. Examination of the left-middle field reveals wheezing. Examination of the right-lower field reveals rhonchi. Examination of the left-lower field reveals rhonchi. Wheezing and rhonchi present.   Abdominal:      General: Bowel sounds are normal. There is no distension.      Palpations: Abdomen is soft.      Tenderness: There is no abdominal tenderness.   Musculoskeletal:         General: Normal range of motion.      Cervical back: Normal range of motion and neck supple.   Skin:     General: Skin is warm and dry.      Capillary Refill: Capillary refill takes less than 2 seconds.   Neurological:      General: No focal deficit present.      Mental Status: She is alert and oriented to person, place, and time.   Psychiatric:         Mood and Affect: Mood normal.         Behavior: Behavior normal.         Thought Content: Thought content normal.         Judgment: Judgment normal.              CRANIAL NERVES     CN III, IV, VI   Pupils are equal, round, and reactive to light.        Significant Labs: All pertinent labs within the past 24 hours have been reviewed.  CBC:   Recent Labs   Lab 02/25/24  1419   WBC 14.19*   HGB 15.3   HCT 46.1        CMP:   Recent Labs   Lab 02/25/24  1419      K 3.2*      CO2 22*      BUN 6*   CREATININE 0.8   CALCIUM 10.1   PROT 7.7   ALBUMIN 4.3   BILITOT 0.5   ALKPHOS 77   AST 18   ALT 27   ANIONGAP 14     Coagulation:   Recent Labs   Lab 02/25/24  1419   INR 1.0   APTT 28.8       Significant Imaging: I have reviewed all pertinent imaging results/findings within the past 24 hours.  Narrative & Impression  EXAMINATION:  XR CHEST PA AND LATERAL     CLINICAL HISTORY:  Asthma;     TECHNIQUE:  PA and lateral views of the chest were performed.     COMPARISON:  None     FINDINGS:  The lungs are clear, with normal appearance of pulmonary vasculature and no pleural effusion or pneumothorax.     The cardiac silhouette is normal in size. The hilar and mediastinal contours are unremarkable.     Bones are intact.     Impression:     No acute abnormality.

## 2024-02-26 NOTE — ASSESSMENT & PLAN NOTE
Patient with Hypoxic Respiratory failure which is Acute.  she is not on home oxygen. Supplemental oxygen was provided and noted-  pt placed on oxygen in emergency room  Patients O2 tank ran out, sats dropped to 88%, tank changed and O2 increased to 4 LNC, stats now 94%.      Electronically signed by Darling Lovell RN at 2/25/2024  8:11 PM    .   Signs/symptoms of respiratory failure include- tachypnea, increased work of breathing, and wheezing. Contributing diagnoses includes -  Covid-19 and chronic asthma  Labs and images were reviewed. Patient Has not had a recent ABG. Will treat underlying causes and adjust management of respiratory failure as follows- supplemental oxygen, duonebs, steroids

## 2024-02-26 NOTE — H&P
[facility]  History & Physical    Patient Name: Saloin Jeff  MRN: [id number]  Patient Class: OP- Observation  Admission Date: 2/25/2024  Attending Physician: Benjamin Nevarez MD   Primary Care Provider: Cleo Cruz MD         Patient information was obtained from patient, spouse/SO, past medical records, and ER records.     Subjective:     Principal Problem:<principal problem not specified>    Chief Complaint:   Chief Complaint   Patient presents with    Shortness of Breath    Cough        HPI: Saloni Jeff is a 63 year old female with a previous medical history of hyperlipidemia, GERD, asthma with reoccuring bronchitis, iron deficiency anemia and gastric sleeve who presents for worsening SOB at rest and exertional since 2/24/24. Patient currently on day 4 of paxlovid after being diagnosed with covid on 2/21/24. Patient was seen by urgent care on Sunday 2/18/24 because she felt that she was having a flare with a her bronchitis for which she was prescribed antibiotics and steroids that she did not take. The next day she traveled to [place] to visit her mother who was hospitalized with Covid and subsequently passed away. Initial ED evaluation revealed a WBC of 14, ferritin of 10, potassium of 3.2, and lactate dehydrogenase of 379. Chest xray normal. Covid today was negative. Will obtain influenza specimen to rule out and six minute walk test on room air. Patient oxygen saturation noted to have dropped to 91% while in ED with increased work of breathing. Given duonebs and 80mg of solumedrol IV. Upon examination patient 94% on 3 liters nasal cannula at rest. Patient to be admitted by hospital medicine for further evaluation and management     Medical History: HLD, GERD, Asthma, Iron deficiency anemia  Surgical History: Gastric Sleeve        Review of patient's allergies indicates:   Allergen Reactions    Sulfa (sulfonamide antibiotics) Rash     UNSURE     Sulfamethoxazole-trimethoprim Rash     UNSURE       No current facility-administered medications on file prior to encounter.     Current Outpatient Medications on File Prior to Encounter   Medication Sig    albuterol (PROVENTIL) 2.5 mg /3 mL (0.083 %) nebulizer solution Take 2.5 mg by nebulization 4 (four) times daily.    albuterol (PROVENTIL/VENTOLIN HFA) 90 mcg/actuation inhaler Inhale 2 puffs into the lungs 4 (four) times daily as needed.    gabapentin (NEURONTIN) 300 MG capsule Take 300 mg by mouth 2 (two) times daily.    olopatadine-mometasone (RYALTRIS) 665-25 mcg/spray Spry 2 sprays by Nasal route 2 (two) times a day.    omeprazole (PRILOSEC) 40 MG capsule Take 40 mg by mouth every morning.    PAXLOVID 300 mg (150 mg x 2)-100 mg copackaged tablets (EUA) Take by mouth.    POLYTUSSIN DM,PYRILAMINE, 12.5-5-7.5 mg/5 mL Liqd Take 7 mLs by mouth 3 (three) times daily as needed.    simvastatin (ZOCOR) 20 MG tablet Take 20 mg by mouth every evening.    sucralfate (CARAFATE) 1 gram tablet Take 1 g by mouth 3 (three) times daily.    zolpidem (AMBIEN) 10 mg Tab Take 1 tablet by mouth every evening.     Family History    None       Tobacco Use    Smoking status: Unknown    Smokeless tobacco: Not on file   Substance and Sexual Activity    Alcohol use: Yes     Comment: social    Drug use: Never    Sexual activity: Not on file     Review of Systems   Constitutional:  Positive for fatigue.   Respiratory:  Positive for shortness of breath and wheezing.    Cardiovascular:  Negative for chest pain, palpitations and leg swelling.   All other systems reviewed and are negative.    Objective:     Vital Signs (Most Recent):  Temp: 97.6 °F (36.4 °C) (02/25/24 1247)  Pulse: 83 (02/25/24 1800)  Resp: 19 (02/25/24 1800)  BP: (!) 164/71 (02/25/24 1800)  SpO2: 97 % (02/25/24 1800) Vital Signs (24h Range):  Temp:  [97.6 °F (36.4 °C)-97.9 °F (36.6 °C)] 97.6 °F (36.4 °C)  Pulse:  [] 83  Resp:  [18-22] 19  SpO2:  [92 %-99 %] 97 %  BP:  (124-178)/(69-86) 164/71     Weight: 78.9 kg (174 lb)  Body mass index is 28.08 kg/m².     Physical Exam  Vitals reviewed.   Constitutional:       General: She is not in acute distress.     Appearance: Normal appearance. She is not ill-appearing.   HENT:      Head: Normocephalic and atraumatic.      Mouth/Throat:      Mouth: Mucous membranes are dry.      Pharynx: Oropharynx is clear.   Eyes:      Extraocular Movements: Extraocular movements intact.      Pupils: Pupils are equal, round, and reactive to light.   Cardiovascular:      Rate and Rhythm: Regular rhythm. Tachycardia present.      Pulses: Normal pulses.      Heart sounds: Normal heart sounds.   Pulmonary:      Breath sounds: Examination of the right-middle field reveals wheezing. Examination of the left-middle field reveals wheezing. Examination of the right-lower field reveals rhonchi. Examination of the left-lower field reveals rhonchi. Wheezing and rhonchi present.   Abdominal:      General: Bowel sounds are normal. There is no distension.      Palpations: Abdomen is soft.      Tenderness: There is no abdominal tenderness.   Musculoskeletal:         General: Normal range of motion.      Cervical back: Normal range of motion and neck supple.   Skin:     General: Skin is warm and dry.      Capillary Refill: Capillary refill takes less than 2 seconds.   Neurological:      General: No focal deficit present.      Mental Status: She is alert and oriented to person, place, and time.   Psychiatric:         Mood and Affect: Mood normal.         Behavior: Behavior normal.         Thought Content: Thought content normal.         Judgment: Judgment normal.              CRANIAL NERVES     CN III, IV, VI   Pupils are equal, round, and reactive to light.       Significant Labs: All pertinent labs within the past 24 hours have been reviewed.  CBC:   Recent Labs   Lab 02/25/24  1419   WBC 14.19*   HGB 15.3   HCT 46.1        CMP:   Recent Labs   Lab 02/25/24  1419       K 3.2*      CO2 22*      BUN 6*   CREATININE 0.8   CALCIUM 10.1   PROT 7.7   ALBUMIN 4.3   BILITOT 0.5   ALKPHOS 77   AST 18   ALT 27   ANIONGAP 14     Coagulation:   Recent Labs   Lab 02/25/24  1419   INR 1.0   APTT 28.8       Significant Imaging: I have reviewed all pertinent imaging results/findings within the past 24 hours.  Narrative & Impression  EXAMINATION:  XR CHEST PA AND LATERAL     CLINICAL HISTORY:  Asthma;     TECHNIQUE:  PA and lateral views of the chest were performed.     COMPARISON:  None     FINDINGS:  The lungs are clear, with normal appearance of pulmonary vasculature and no pleural effusion or pneumothorax.     The cardiac silhouette is normal in size. The hilar and mediastinal contours are unremarkable.     Bones are intact.     Impression:     No acute abnormality.     Assessment/Plan:     Hypokalemia  Patient has hypokalemia which is Acute and currently uncontrolled. Most recent potassium levels reviewed-   Lab Results   Component Value Date    K 3.2 (L) 02/25/2024   . Will continue potassium replacement per protocol and recheck repeat levels after replacement completed.     40 mEq of po potassium administered total   Will trend with CMP in AM    COVID-19  Patient is identified as Severe COVID-19 based on hypoxemia with O2 saturations <94% on room air or on ambulation   Initiate standard COVID protocols; COVID-19 testing ,Infection Control notification  and isolation- respiratory, contact and droplet per protocol    Diagnostics: CBC, CMP, Ferritin, CRP, LDH, BNP, ECG, Rapid Flu, Blood Culture x2, and Portable CXR, lactic acid, CPK. PT/INR, SED rate    Management: Initiate targeted therapy with Remdesivir, 200mg IV x1, followed by 100mg IV daily x5 days total, Dexamethasone PO/IV 6mg daily x10 days, and Anticoagulation, Patient admitted to critical care- Will initiate prophylactic dose anticoagulation, Maintain oxygen saturations 92-96% via Nasal Cannula 3 (portable)  LPM and monitor with continuous/intermittent pulse oximetry. , nebulized bronchodilators as needed Q 6 hours for shortness of breath., and Continuous cardiac monitoring.    Advance Care Planning Current advance care plan has not been discussed with patient/family/POA and patient currently wishes Full Code.     Gastroesophageal reflux disease without esophagitis  Chronic problem noted will continue PPI daily        VTE Risk Mitigation (From admission, onward)           Ordered     enoxaparin injection 40 mg  Every 24 hours         02/25/24 1733                         On 02/25/2024, patient should be placed in hospital observation services under my care in collaboration with Dr. Benjamin Nevarez MD.      Automatic Inhaler to Nebulizer Interchange    ipratropium/albuterol (Combivent Respimat) 20 mcg/100 mcg-120 mcg/600 mcg changed to ipratropium/albuterol 0.5 mg/2.5 mg ED x 1  per [facility] Automatic Therapeutic Substitutions Protocol.    Please contact pharmacy at extension [phone] with any questions.     Thank you,   Romy Abebe NP  Department of Hospital Medicine  [facility]

## 2024-02-26 NOTE — ED NOTES
Saloni Jeff is awake, alert and oriented x 3, skin warm and dry, in NAD with family at bedside.  Hep Lock in place, site OK, side rails up, call bell within reach.

## 2024-02-26 NOTE — ED NOTES
Patients O2 tank ran out, sats dropped to 88%, tank changed and O2 increased to 4 LNC, stats now 94%.

## 2024-02-26 NOTE — NURSING
Pt assisted to shower, soap, towels and shower chair provided. IV wrapped. Family to standby for assistance if needed. Bed pad changed.

## 2024-02-26 NOTE — H&P
[facility]  History & Physical    Patient Name: Saloni Jeff  MRN: [id number]  Patient Class: OP- Observation  Admission Date: 2/25/2024  Attending Physician: Benjamin Nevarez MD   Primary Care Provider: Cleo Cruz MD         Patient information was obtained from patient, spouse/SO, past medical records, and ER records.     Subjective:     Principal Problem: Covid-19    Chief Complaint:   Chief Complaint   Patient presents with    Shortness of Breath    Cough        HPI: Saloni Jeff is a 63 year old female with a previous medical history of hyperlipidemia, GERD, asthma with reoccuring bronchitis, iron deficiency anemia and gastric sleeve who presents for worsening SOB at rest and exertional since 2/24/24. Patient currently on day 4 of paxlovid after being diagnosed with covid on 2/21/24. Patient was seen by urgent care on Sunday 2/18/24 because she felt that she was having a flare with a her bronchitis for which she was prescribed antibiotics and steroids that she did not take. The next day she traveled to [place] to visit her mother who was hospitalized with Covid and subsequently passed away. Initial ED evaluation revealed a WBC of 14, ferritin of 10, potassium of 3.2, and lactate dehydrogenase of 379. Chest xray normal. Covid today was negative. Will obtain influenza specimen to rule out and six minute walk test on room air. Patient oxygen saturation noted to have dropped to 91% while in ED with increased work of breathing. Given duonebs and 80mg of solumedrol IV. Upon examination patient 94% on 3 liters nasal cannula at rest. Patient to be admitted by hospital medicine for further evaluation and management     Medical History: HLD, GERD, Asthma, Iron deficiency anemia  Surgical History: Gastric Sleeve        Review of patient's allergies indicates:   Allergen Reactions    Sulfa (sulfonamide antibiotics) Rash     UNSURE     Sulfamethoxazole-trimethoprim Rash     UNSURE       No current facility-administered medications on file prior to encounter.     Current Outpatient Medications on File Prior to Encounter   Medication Sig    albuterol (PROVENTIL) 2.5 mg /3 mL (0.083 %) nebulizer solution Take 2.5 mg by nebulization 4 (four) times daily.    albuterol (PROVENTIL/VENTOLIN HFA) 90 mcg/actuation inhaler Inhale 2 puffs into the lungs 4 (four) times daily as needed.    gabapentin (NEURONTIN) 300 MG capsule Take 300 mg by mouth 2 (two) times daily.    olopatadine-mometasone (RYALTRIS) 665-25 mcg/spray Spry 2 sprays by Nasal route 2 (two) times a day.    omeprazole (PRILOSEC) 40 MG capsule Take 40 mg by mouth every morning.    PAXLOVID 300 mg (150 mg x 2)-100 mg copackaged tablets (EUA) Take by mouth.    POLYTUSSIN DM,PYRILAMINE, 12.5-5-7.5 mg/5 mL Liqd Take 7 mLs by mouth 3 (three) times daily as needed.    simvastatin (ZOCOR) 20 MG tablet Take 20 mg by mouth every evening.    sucralfate (CARAFATE) 1 gram tablet Take 1 g by mouth 3 (three) times daily.    zolpidem (AMBIEN) 10 mg Tab Take 1 tablet by mouth every evening.     Family History    None       Tobacco Use    Smoking status: Unknown    Smokeless tobacco: Not on file   Substance and Sexual Activity    Alcohol use: Yes     Comment: social    Drug use: Never    Sexual activity: Not on file     Review of Systems   Constitutional:  Positive for fatigue.   Respiratory:  Positive for shortness of breath and wheezing.    Cardiovascular:  Negative for chest pain, palpitations and leg swelling.   All other systems reviewed and are negative.    Objective:     Vital Signs (Most Recent):  Temp: 97.6 °F (36.4 °C) (02/25/24 1247)  Pulse: 83 (02/25/24 1800)  Resp: 19 (02/25/24 1800)  BP: (!) 164/71 (02/25/24 1800)  SpO2: 97 % (02/25/24 1800) Vital Signs (24h Range):  Temp:  [97.6 °F (36.4 °C)-97.9 °F (36.6 °C)] 97.6 °F (36.4 °C)  Pulse:  [] 83  Resp:  [18-22] 19  SpO2:  [92 %-99 %] 97 %  BP:  (124-178)/(69-86) 164/71     Weight: 78.9 kg (174 lb)  Body mass index is 28.08 kg/m².     Physical Exam  Vitals reviewed.   Constitutional:       General: She is not in acute distress.     Appearance: Normal appearance. She is not ill-appearing.   HENT:      Head: Normocephalic and atraumatic.      Mouth/Throat:      Mouth: Mucous membranes are dry.      Pharynx: Oropharynx is clear.   Eyes:      Extraocular Movements: Extraocular movements intact.      Pupils: Pupils are equal, round, and reactive to light.   Cardiovascular:      Rate and Rhythm: Regular rhythm. Tachycardia present.      Pulses: Normal pulses.      Heart sounds: Normal heart sounds.   Pulmonary:      Breath sounds: Examination of the right-middle field reveals wheezing. Examination of the left-middle field reveals wheezing. Examination of the right-lower field reveals rhonchi. Examination of the left-lower field reveals rhonchi. Wheezing and rhonchi present.   Abdominal:      General: Bowel sounds are normal. There is no distension.      Palpations: Abdomen is soft.      Tenderness: There is no abdominal tenderness.   Musculoskeletal:         General: Normal range of motion.      Cervical back: Normal range of motion and neck supple.   Skin:     General: Skin is warm and dry.      Capillary Refill: Capillary refill takes less than 2 seconds.   Neurological:      General: No focal deficit present.      Mental Status: She is alert and oriented to person, place, and time.   Psychiatric:         Mood and Affect: Mood normal.         Behavior: Behavior normal.         Thought Content: Thought content normal.         Judgment: Judgment normal.              CRANIAL NERVES     CN III, IV, VI   Pupils are equal, round, and reactive to light.       Significant Labs: All pertinent labs within the past 24 hours have been reviewed.  CBC:   Recent Labs   Lab 02/25/24  1419   WBC 14.19*   HGB 15.3   HCT 46.1        CMP:   Recent Labs   Lab 02/25/24  1419       K 3.2*      CO2 22*      BUN 6*   CREATININE 0.8   CALCIUM 10.1   PROT 7.7   ALBUMIN 4.3   BILITOT 0.5   ALKPHOS 77   AST 18   ALT 27   ANIONGAP 14     Coagulation:   Recent Labs   Lab 02/25/24  1419   INR 1.0   APTT 28.8       Significant Imaging: I have reviewed all pertinent imaging results/findings within the past 24 hours.  Narrative & Impression  EXAMINATION:  XR CHEST PA AND LATERAL     CLINICAL HISTORY:  Asthma;     TECHNIQUE:  PA and lateral views of the chest were performed.     COMPARISON:  None     FINDINGS:  The lungs are clear, with normal appearance of pulmonary vasculature and no pleural effusion or pneumothorax.     The cardiac silhouette is normal in size. The hilar and mediastinal contours are unremarkable.     Bones are intact.     Impression:     No acute abnormality.     Assessment/Plan:     Acute respiratory failure with hypoxia  Patient with Hypoxic Respiratory failure which is Acute.  she is not on home oxygen. Supplemental oxygen was provided and noted-  pt placed on oxygen in emergency room  Patients O2 tank ran out, sats dropped to 88%, tank changed and O2 increased to 4 LNC, stats now 94%.      Electronically signed by Darling Lovell RN at 2/25/2024  8:11 PM    Walk-test pending     .   Signs/symptoms of respiratory failure include- tachypnea, increased work of breathing, and wheezing. Contributing diagnoses includes -  Covid-19 and chronic asthma  Labs and images were reviewed. Patient Has not had a recent ABG. Will treat underlying causes and adjust management of respiratory failure as follows- supplemental oxygen, duonebs, steroids    Hypokalemia  Patient has hypokalemia which is Acute and currently uncontrolled. Most recent potassium levels reviewed-   Lab Results   Component Value Date    K 3.2 (L) 02/25/2024   . Will continue potassium replacement per protocol and recheck repeat levels after replacement completed.     40 mEq of po potassium  administered total   Will trend with CMP in AM    COVID-19  Patient is identified as Severe COVID-19 based on hypoxemia with O2 saturations <94% on room air or on ambulation   Initiate standard COVID protocols; COVID-19 testing ,Infection Control notification  and isolation- respiratory, contact and droplet per protocol    Diagnostics: CBC, CMP, Ferritin, CRP, LDH, BNP, ECG, Rapid Flu, Blood Culture x2, and Portable CXR, lactic acid, CPK. PT/INR, SED rate    Management: Initiate targeted therapy with Remdesivir, 200mg IV x1, followed by 100mg IV daily x5 days total, Dexamethasone PO/IV 6mg daily x10 days, and Anticoagulation, Patient admitted to critical care- Will initiate prophylactic dose anticoagulation, Maintain oxygen saturations 92-96% via Nasal Cannula 3 (portable) LPM and monitor with continuous/intermittent pulse oximetry. , nebulized bronchodilators as needed Q 6 hours for shortness of breath., and Continuous cardiac monitoring.    Advance Care Planning Current advance care plan has not been discussed with patient/family/POA and patient currently wishes Full Code.     Gastroesophageal reflux disease without esophagitis  Chronic problem noted will continue PPI daily        VTE Risk Mitigation (From admission, onward)           Ordered     enoxaparin injection 40 mg  Every 24 hours         02/25/24 1733                         On 02/25/2024, patient should be placed in hospital observation services under my care in collaboration with Dr. Benjamin Nevarez MD.      Automatic Inhaler to Nebulizer Interchange    ipratropium/albuterol (Combivent Respimat) 20 mcg/100 mcg-120 mcg/600 mcg changed to ipratropium/albuterol 0.5 mg/2.5 mg ED x 1  per [facility] Automatic Therapeutic Substitutions Protocol.    Please contact pharmacy at extension [phone] with any questions.     Thank you,   Romy Abebe NP  Department of Hospital Medicine  [facility]

## 2024-02-26 NOTE — SUBJECTIVE & OBJECTIVE
Interval History:  Pt seen and examined.  Feeling slightly better than yesterday, but still short of breath with cough.  Appetite with slight improvement today.  Has been mobile around room.  Discussed another day of remdesivir and continued work on weaning the oxygen.  Will reassess tomorrow to see if she is progressed enough to discharge home.    Review of Systems   Constitutional:  Positive for appetite change.   Respiratory:  Positive for cough and shortness of breath.    Cardiovascular:  Negative for chest pain.   Gastrointestinal:  Negative for nausea and vomiting.     Objective:     Vital Signs (Most Recent):  Temp: 98 °F (36.7 °C) (02/26/24 1156)  Pulse: 85 (02/26/24 1220)  Resp: 18 (02/26/24 1220)  BP: (!) 165/79 (02/26/24 1156)  SpO2: 96 % (02/26/24 1407) Vital Signs (24h Range):  Temp:  [98 °F (36.7 °C)-98.5 °F (36.9 °C)] 98 °F (36.7 °C)  Pulse:  [] 85  Resp:  [16-21] 18  SpO2:  [93 %-99 %] 96 %  BP: (125-178)/(64-89) 165/79     Weight: 78.9 kg (173 lb 15.1 oz)  Body mass index is 28.08 kg/m².    Intake/Output Summary (Last 24 hours) at 2/26/2024 1413  Last data filed at 2/26/2024 1234  Gross per 24 hour   Intake 900 ml   Output --   Net 900 ml         Physical Exam  Vitals and nursing note reviewed.   Constitutional:       Appearance: Normal appearance.   Cardiovascular:      Rate and Rhythm: Normal rate and regular rhythm.   Pulmonary:      Effort: Pulmonary effort is normal. No respiratory distress.      Breath sounds: Normal breath sounds. No wheezing.      Comments: Comfortable on 3 L nasal cannula  Abdominal:      General: Abdomen is flat. Bowel sounds are normal.      Palpations: Abdomen is soft.   Musculoskeletal:         General: Normal range of motion.   Skin:     General: Skin is warm and dry.      Capillary Refill: Capillary refill takes less than 2 seconds.   Neurological:      General: No focal deficit present.      Mental Status: She is alert and oriented to person, place, and time.  Mental status is at baseline.   Psychiatric:         Mood and Affect: Mood normal.         Behavior: Behavior normal.             Significant Labs: All pertinent labs within the past 24 hours have been reviewed.  CBC:   Recent Labs   Lab 02/25/24  1419   WBC 14.19*   HGB 15.3   HCT 46.1        CMP:   Recent Labs   Lab 02/25/24  1419 02/26/24  0522    141   K 3.2* 4.7    108   CO2 22* 23    179*   BUN 6* 10   CREATININE 0.8 0.9   CALCIUM 10.1 10.6*   PROT 7.7 7.2   ALBUMIN 4.3 3.9   BILITOT 0.5 0.3   ALKPHOS 77 74   AST 18 16   ALT 27 24   ANIONGAP 14 10       Significant Imaging: I have reviewed all pertinent imaging results/findings within the past 24 hours.

## 2024-02-26 NOTE — CARE UPDATE
Patient to be transferred to [facility] due to capacity issues. Spoke with Dr. Quiroga, who will accept the patient. Transfer order placed

## 2024-02-26 NOTE — ASSESSMENT & PLAN NOTE
Patient is identified as Severe COVID-19 based on hypoxemia with O2 saturations <94% on room air or on ambulation   Initiate standard COVID protocols; COVID-19 testing ,Infection Control notification  and isolation- respiratory, contact and droplet per protocol    Diagnostics: CBC, CMP, Ferritin, CRP, LDH, BNP, ECG, Rapid Flu, Blood Culture x2, and Portable CXR, lactic acid, CPK. PT/INR, SED rate    Management: Initiate targeted therapy with Remdesivir, 200mg IV x1, followed by 100mg IV daily x5 days total, Dexamethasone PO/IV 6mg daily x10 days, and Anticoagulation, Patient admitted to critical care- Will initiate prophylactic dose anticoagulation, Maintain oxygen saturations 92-96% via Nasal Cannula 3 (portable) LPM and monitor with continuous/intermittent pulse oximetry. , nebulized bronchodilators as needed Q 6 hours for shortness of breath., and Continuous cardiac monitoring.    Advance Care Planning  Current advance care plan has not been discussed with patient/family/POA and patient currently wishes Full Code.

## 2024-02-26 NOTE — HPI
Saloni Jeff is a 63 year old female with a previous medical history of hyperlipidemia, GERD, asthma with reoccuring bronchitis, iron deficiency anemia and gastric sleeve who presents for worsening SOB at rest and exertional since 2/24/24. Patient currently on day 4 of paxlovid after being diagnosed with covid on 2/21/24. Patient was seen by urgent care on Sunday 2/18/24 because she felt that she was having a flare with a her bronchitis for which she was prescribed antibiotics and steroids that she did not take. The next day she traveled to [place] to visit her mother who was hospitalized with Covid and subsequently passed away. Initial ED evaluation revealed a WBC of 14, ferritin of 10, potassium of 3.2, and lactate dehydrogenase of 379. Chest xray normal. Covid today was negative. Will obtain influenza specimen to rule out and six minute walk test on room air. Patient oxygen saturation noted to have dropped to 91% while in ED with increased work of breathing. Given duonebs and 80mg of solumedrol IV. Upon examination patient 94% on 3 liters nasal cannula at rest. Patient to be admitted by hospital medicine for further evaluation and management

## 2024-02-26 NOTE — ASSESSMENT & PLAN NOTE
Patient has hypokalemia which is Acute and currently uncontrolled. Most recent potassium levels reviewed-   Lab Results   Component Value Date    K 3.2 (L) 02/25/2024   . Will continue potassium replacement per protocol and recheck repeat levels after replacement completed.     40 mEq of po potassium administered total   Will trend with CMP in AM

## 2024-02-26 NOTE — PROGRESS NOTES
[facility]  Progress Note    Patient Name: Saloni Jeff  MRN: [id number]  Patient Class: OP- Observation   Admission Date: 2/25/2024  Length of Stay: 0 days  Attending Physician: Alisa Harvey MD  Primary Care Provider: Cleo Cruz MD        Subjective:     Principal Problem:COVID-19        HPI:  Saloni Jeff is a 63 year old female with a previous medical history of hyperlipidemia, GERD, asthma with reoccuring bronchitis, iron deficiency anemia and gastric sleeve who presents for worsening SOB at rest and exertional since 2/24/24. Patient currently on day 4 of paxlovid after being diagnosed with covid on 2/21/24. Patient was seen by urgent care on Sunday 2/18/24 because she felt that she was having a flare with a her bronchitis for which she was prescribed antibiotics and steroids that she did not take. The next day she traveled to [place] to visit her mother who was hospitalized with Covid and subsequently passed away. Initial ED evaluation revealed a WBC of 14, ferritin of 10, potassium of 3.2, and lactate dehydrogenase of 379. Chest xray normal. Covid today was negative. Will obtain influenza specimen to rule out and six minute walk test on room air. Patient oxygen saturation noted to have dropped to 91% while in ED with increased work of breathing. Given duonebs and 80mg of solumedrol IV. Upon examination patient 94% on 3 liters nasal cannula at rest. Patient to be admitted by hospital medicine for further evaluation and management     Overview/Hospital Course:  No notes on file    Interval History:  Pt seen and examined.  Feeling slightly better than yesterday, but still short of breath with cough.  Appetite with slight improvement today.  Has been mobile around room.  Discussed another day of remdesivir and continued work on weaning the oxygen.  Will reassess tomorrow to see if she is progressed enough to discharge home.    Review of Systems    Constitutional:  Positive for appetite change.   Respiratory:  Positive for cough and shortness of breath.    Cardiovascular:  Negative for chest pain.   Gastrointestinal:  Negative for nausea and vomiting.     Objective:     Vital Signs (Most Recent):  Temp: 98 °F (36.7 °C) (02/26/24 1156)  Pulse: 85 (02/26/24 1220)  Resp: 18 (02/26/24 1220)  BP: (!) 165/79 (02/26/24 1156)  SpO2: 96 % (02/26/24 1407) Vital Signs (24h Range):  Temp:  [98 °F (36.7 °C)-98.5 °F (36.9 °C)] 98 °F (36.7 °C)  Pulse:  [] 85  Resp:  [16-21] 18  SpO2:  [93 %-99 %] 96 %  BP: (125-178)/(64-89) 165/79     Weight: 78.9 kg (173 lb 15.1 oz)  Body mass index is 28.08 kg/m².    Intake/Output Summary (Last 24 hours) at 2/26/2024 1413  Last data filed at 2/26/2024 1234  Gross per 24 hour   Intake 900 ml   Output --   Net 900 ml         Physical Exam  Vitals and nursing note reviewed.   Constitutional:       Appearance: Normal appearance.   Cardiovascular:      Rate and Rhythm: Normal rate and regular rhythm.   Pulmonary:      Effort: Pulmonary effort is normal. No respiratory distress.      Breath sounds: Normal breath sounds. No wheezing.      Comments: Comfortable on 3 L nasal cannula  Abdominal:      General: Abdomen is flat. Bowel sounds are normal.      Palpations: Abdomen is soft.   Musculoskeletal:         General: Normal range of motion.   Skin:     General: Skin is warm and dry.      Capillary Refill: Capillary refill takes less than 2 seconds.   Neurological:      General: No focal deficit present.      Mental Status: She is alert and oriented to person, place, and time. Mental status is at baseline.   Psychiatric:         Mood and Affect: Mood normal.         Behavior: Behavior normal.             Significant Labs: All pertinent labs within the past 24 hours have been reviewed.  CBC:   Recent Labs   Lab 02/25/24  1419   WBC 14.19*   HGB 15.3   HCT 46.1        CMP:   Recent Labs   Lab 02/25/24  1419 02/26/24  0522    141    K 3.2* 4.7    108   CO2 22* 23    179*   BUN 6* 10   CREATININE 0.8 0.9   CALCIUM 10.1 10.6*   PROT 7.7 7.2   ALBUMIN 4.3 3.9   BILITOT 0.5 0.3   ALKPHOS 77 74   AST 18 16   ALT 27 24   ANIONGAP 14 10       Significant Imaging: I have reviewed all pertinent imaging results/findings within the past 24 hours.    Assessment/Plan:      * COVID-19  Patient is identified as Severe COVID-19 based on hypoxemia with O2 saturations <94% on room air or on ambulation   Initiate standard COVID protocols; COVID-19 testing ,Infection Control notification  and isolation- respiratory, contact and droplet per protocol    Diagnostics: CBC, CMP, Ferritin, CRP, LDH, BNP, ECG, Rapid Flu, Blood Culture x2, and Portable CXR, lactic acid, CPK. PT/INR, SED rate    Management: Initiate targeted therapy with Remdesivir, 200mg IV x1, followed by 100mg IV daily x5 days total, Dexamethasone PO/IV 6mg daily x10 days, and Anticoagulation, Patient admitted to critical care- Will initiate prophylactic dose anticoagulation, Maintain oxygen saturations 92-96% via Nasal Cannula 3 (portable) LPM and monitor with continuous/intermittent pulse oximetry. , nebulized bronchodilators as needed Q 6 hours for shortness of breath., and Continuous cardiac monitoring.    Advance Care Planning Current advance care plan has not been discussed with patient/family/POA and patient currently wishes Full Code.     Acute respiratory failure with hypoxia  Patient with Hypoxic Respiratory failure which is Acute.  she is not on home oxygen. Supplemental oxygen was provided and noted-  pt placed on oxygen in emergency room  Patients O2 tank ran out, sats dropped to 88%, tank changed and O2 increased to 4 LNC, stats now 94%.      Electronically signed by Darling Lovell RN at 2/25/2024  8:11 PM    .   Signs/symptoms of respiratory failure include- tachypnea, increased work of breathing, and wheezing. Contributing diagnoses includes -  Covid-19 and chronic  asthma  Labs and images were reviewed. Patient Has not had a recent ABG. Will treat underlying causes and adjust management of respiratory failure as follows- supplemental oxygen, duonebs, steroids  -D-dimer notably negative  -continue the COVID treatment and wean oxygen as able    Unspecified chronic bronchitis  Pt with background Hx of chronic bronchitis now with COVID with significant symptomatology and hypoxia   -continue COVID treatment, supportive care with dexamethasone, remdesivir, bronchodilators   -continue supplemental oxygen and wean as able      Gastroesophageal reflux disease without esophagitis  Chronic problem noted will continue PPI daily        VTE Risk Mitigation (From admission, onward)           Ordered     enoxaparin injection 40 mg  Every 24 hours         02/25/24 8710                    Discharge Planning   NOE: 2/28/2024     Code Status: Full Code   Is the patient medically ready for discharge?:     Reason for patient still in hospital (select all that apply): Patient trending condition and Treatment                     Selam Almeida NP  Department of Hospital Medicine   [facility]

## 2024-02-26 NOTE — ASSESSMENT & PLAN NOTE
Pt with background Hx of chronic bronchitis now with COVID with significant symptomatology and hypoxia   -continue COVID treatment, supportive care with dexamethasone, remdesivir, bronchodilators   -continue supplemental oxygen and wean as able

## 2024-02-27 LAB
ALBUMIN SERPL BCP-MCNC: 3.7 G/DL (ref 3.5–5.2)
ALP SERPL-CCNC: 52 U/L (ref 55–135)
ALT SERPL W/O P-5'-P-CCNC: 19 U/L (ref 10–44)
ANION GAP SERPL CALC-SCNC: 5 MMOL/L (ref 8–16)
AST SERPL-CCNC: 15 U/L (ref 10–40)
BASOPHILS # BLD AUTO: 0.02 K/UL (ref 0–0.2)
BASOPHILS NFR BLD: 0.1 % (ref 0–1.9)
BILIRUB SERPL-MCNC: 0.2 MG/DL (ref 0.1–1)
BUN SERPL-MCNC: 25 MG/DL (ref 8–23)
CALCIUM SERPL-MCNC: 9.6 MG/DL (ref 8.7–10.5)
CHLORIDE SERPL-SCNC: 108 MMOL/L (ref 95–110)
CO2 SERPL-SCNC: 26 MMOL/L (ref 23–29)
CREAT SERPL-MCNC: 0.7 MG/DL (ref 0.5–1.4)
D DIMER PPP IA.FEU-MCNC: 0.19 MG/L FEU (ref 0–0.49)
DIFFERENTIAL METHOD BLD: ABNORMAL
EOSINOPHIL # BLD AUTO: 0 K/UL (ref 0–0.5)
EOSINOPHIL NFR BLD: 0 % (ref 0–8)
ERYTHROCYTE [DISTWIDTH] IN BLOOD BY AUTOMATED COUNT: 13.6 % (ref 11.5–14.5)
EST. GFR  (NO RACE VARIABLE): >60 ML/MIN/1.73 M^2
GLUCOSE SERPL-MCNC: 146 MG/DL (ref 70–110)
HCT VFR BLD AUTO: 39.1 % (ref 37–48.5)
HGB BLD-MCNC: 12.8 G/DL (ref 12–16)
IMM GRANULOCYTES # BLD AUTO: 0.11 K/UL (ref 0–0.04)
IMM GRANULOCYTES NFR BLD AUTO: 0.5 % (ref 0–0.5)
LYMPHOCYTES # BLD AUTO: 0.8 K/UL (ref 1–4.8)
LYMPHOCYTES NFR BLD: 3.8 % (ref 18–48)
MAGNESIUM SERPL-MCNC: 2.1 MG/DL (ref 1.6–2.6)
MCH RBC QN AUTO: 31.4 PG (ref 27–31)
MCHC RBC AUTO-ENTMCNC: 32.7 G/DL (ref 32–36)
MCV RBC AUTO: 96 FL (ref 82–98)
MONOCYTES # BLD AUTO: 0.5 K/UL (ref 0.3–1)
MONOCYTES NFR BLD: 2.4 % (ref 4–15)
NEUTROPHILS # BLD AUTO: 18.9 K/UL (ref 1.8–7.7)
NEUTROPHILS NFR BLD: 93.2 % (ref 38–73)
NRBC BLD-RTO: 0 /100 WBC
PHOSPHATE SERPL-MCNC: 3.7 MG/DL (ref 2.7–4.5)
PLATELET # BLD AUTO: 305 K/UL (ref 150–450)
PMV BLD AUTO: 10.7 FL (ref 9.2–12.9)
POTASSIUM SERPL-SCNC: 4.1 MMOL/L (ref 3.5–5.1)
PROT SERPL-MCNC: 6.2 G/DL (ref 6–8.4)
RBC # BLD AUTO: 4.07 M/UL (ref 4–5.4)
SODIUM SERPL-SCNC: 139 MMOL/L (ref 136–145)
WBC # BLD AUTO: 20.32 K/UL (ref 3.9–12.7)

## 2024-02-27 PROCEDURE — 25000003 PHARM REV CODE 250: Performed by: NURSE PRACTITIONER

## 2024-02-27 PROCEDURE — 94761 N-INVAS EAR/PLS OXIMETRY MLT: CPT

## 2024-02-27 PROCEDURE — 85025 COMPLETE CBC W/AUTO DIFF WBC: CPT

## 2024-02-27 PROCEDURE — 63600175 PHARM REV CODE 636 W HCPCS

## 2024-02-27 PROCEDURE — 94640 AIRWAY INHALATION TREATMENT: CPT

## 2024-02-27 PROCEDURE — 85379 FIBRIN DEGRADATION QUANT: CPT

## 2024-02-27 PROCEDURE — 99900035 HC TECH TIME PER 15 MIN (STAT)

## 2024-02-27 PROCEDURE — 84100 ASSAY OF PHOSPHORUS: CPT

## 2024-02-27 PROCEDURE — 21400001 HC TELEMETRY ROOM

## 2024-02-27 PROCEDURE — 25000242 PHARM REV CODE 250 ALT 637 W/ HCPCS: Performed by: STUDENT IN AN ORGANIZED HEALTH CARE EDUCATION/TRAINING PROGRAM

## 2024-02-27 PROCEDURE — 80053 COMPREHEN METABOLIC PANEL: CPT

## 2024-02-27 PROCEDURE — 36415 COLL VENOUS BLD VENIPUNCTURE: CPT

## 2024-02-27 PROCEDURE — 27000221 HC OXYGEN, UP TO 24 HOURS

## 2024-02-27 PROCEDURE — 96366 THER/PROPH/DIAG IV INF ADDON: CPT

## 2024-02-27 PROCEDURE — 83735 ASSAY OF MAGNESIUM: CPT

## 2024-02-27 PROCEDURE — 25000003 PHARM REV CODE 250

## 2024-02-27 RX ORDER — GUAIFENESIN 600 MG/1
600 TABLET, EXTENDED RELEASE ORAL 2 TIMES DAILY
Status: DISCONTINUED | OUTPATIENT
Start: 2024-02-27 | End: 2024-02-28 | Stop reason: HOSPADM

## 2024-02-27 RX ADMIN — DEXAMETHASONE 6 MG: 2 TABLET ORAL at 09:02

## 2024-02-27 RX ADMIN — IPRATROPIUM BROMIDE AND ALBUTEROL SULFATE 3 ML: 2.5; .5 SOLUTION RESPIRATORY (INHALATION) at 12:02

## 2024-02-27 RX ADMIN — GUAIFENESIN 600 MG: 600 TABLET, EXTENDED RELEASE ORAL at 02:02

## 2024-02-27 RX ADMIN — OXYCODONE HYDROCHLORIDE AND ACETAMINOPHEN 500 MG: 500 TABLET ORAL at 09:02

## 2024-02-27 RX ADMIN — IPRATROPIUM BROMIDE AND ALBUTEROL SULFATE 3 ML: 2.5; .5 SOLUTION RESPIRATORY (INHALATION) at 01:02

## 2024-02-27 RX ADMIN — ACETAMINOPHEN 650 MG: 325 TABLET ORAL at 05:02

## 2024-02-27 RX ADMIN — GUAIFENESIN 600 MG: 600 TABLET, EXTENDED RELEASE ORAL at 09:02

## 2024-02-27 RX ADMIN — REMDESIVIR 100 MG: 100 INJECTION, POWDER, LYOPHILIZED, FOR SOLUTION INTRAVENOUS at 08:02

## 2024-02-27 RX ADMIN — IPRATROPIUM BROMIDE AND ALBUTEROL SULFATE 3 ML: 2.5; .5 SOLUTION RESPIRATORY (INHALATION) at 07:02

## 2024-02-27 RX ADMIN — GABAPENTIN 300 MG: 300 CAPSULE ORAL at 09:02

## 2024-02-27 RX ADMIN — HYDROCODONE BITARTRATE AND ACETAMINOPHEN 1 TABLET: 5; 325 TABLET ORAL at 09:02

## 2024-02-27 RX ADMIN — GABAPENTIN 300 MG: 300 CAPSULE ORAL at 08:02

## 2024-02-27 RX ADMIN — ACETAMINOPHEN 650 MG: 325 TABLET ORAL at 02:02

## 2024-02-27 RX ADMIN — OXYCODONE HYDROCHLORIDE AND ACETAMINOPHEN 500 MG: 500 TABLET ORAL at 08:02

## 2024-02-27 RX ADMIN — MELATONIN TAB 3 MG 9 MG: 3 TAB at 09:02

## 2024-02-27 RX ADMIN — HYDROCODONE BITARTRATE AND ACETAMINOPHEN 1 TABLET: 5; 325 TABLET ORAL at 08:02

## 2024-02-27 RX ADMIN — ENOXAPARIN SODIUM 40 MG: 40 INJECTION SUBCUTANEOUS at 06:02

## 2024-02-27 RX ADMIN — IPRATROPIUM BROMIDE AND ALBUTEROL SULFATE 3 ML: 2.5; .5 SOLUTION RESPIRATORY (INHALATION) at 08:02

## 2024-02-27 RX ADMIN — MULTIVITAMIN TABLET 1 TABLET: TABLET at 08:02

## 2024-02-27 NOTE — RESPIRATORY THERAPY
02/26/24 2007   Patient Assessment/Suction   Level of Consciousness (AVPU) alert   Respiratory Effort Normal;Unlabored   Expansion/Accessory Muscles/Retractions no use of accessory muscles   All Lung Fields Breath Sounds Anterior:;diminished;wheezes, expiratory   Rhythm/Pattern, Respiratory unlabored;pattern regular;depth regular;no shortness of breath reported   PRE-TX-O2   Device (Oxygen Therapy) nasal cannula   Flow (L/min) 3   SpO2 97 %   Pulse Oximetry Type Continuous   $ Pulse Oximetry - Multiple Charge Pulse Oximetry - Multiple   Pulse 89   Resp 17   Aerosol Therapy   $ Aerosol Therapy Charges Aerosol Treatment   Daily Review of Necessity (SVN) completed   Respiratory Treatment Status (SVN) given   Treatment Route (SVN) mask;oxygen   Patient Position (SVN) HOB elevated   Post Treatment Assessment (SVN) increased aeration   Signs of Intolerance (SVN) none   Breath Sounds Post-Respiratory Treatment   Post-treatment Heart Rate (beats/min) 89   Post-treatment Resp Rate (breaths/min) 17   Respiratory Evaluation   $ Care Plan Tech Time 15 min

## 2024-02-27 NOTE — SUBJECTIVE & OBJECTIVE
Interval History:  Pt seen and examined.  Home O2 eval was done and Pt required 3 L to maintain greater than 90% sats.  She complains of inability to loosen secretions.  Mucinex was added.  Hydration was encouraged.  We will continue current treatment plan, monitor overnight, and if can continue weaning oxygen consideration for discharge tomorrow.    Review of Systems   Constitutional:  Negative for chills and fever.   Respiratory:  Positive for cough, shortness of breath and wheezing.    Cardiovascular:  Negative for chest pain.   Gastrointestinal:  Negative for nausea and vomiting.     Objective:     Vital Signs (Most Recent):  Temp: 97.9 °F (36.6 °C) (02/27/24 1156)  Pulse: 94 (02/27/24 1305)  Resp: 18 (02/27/24 1305)  BP: 123/60 (02/27/24 1156)  SpO2: (!) 94 % (02/27/24 1305) Vital Signs (24h Range):  Temp:  [97.8 °F (36.6 °C)-98.2 °F (36.8 °C)] 97.9 °F (36.6 °C)  Pulse:  [62-94] 94  Resp:  [16-18] 18  SpO2:  [91 %-99 %] 94 %  BP: (110-131)/(53-61) 123/60     Weight: 78.9 kg (173 lb 15.1 oz)  Body mass index is 28.08 kg/m².    Intake/Output Summary (Last 24 hours) at 2/27/2024 1324  Last data filed at 2/26/2024 1910  Gross per 24 hour   Intake 320 ml   Output --   Net 320 ml         Physical Exam  Vitals and nursing note reviewed.   Constitutional:       Appearance: Normal appearance.   Cardiovascular:      Rate and Rhythm: Normal rate and regular rhythm.   Pulmonary:      Breath sounds: Wheezing present.      Comments: Comfortable on 3 L oxygen  Abdominal:      General: Abdomen is flat. Bowel sounds are normal.      Palpations: Abdomen is soft.   Musculoskeletal:         General: Normal range of motion.      Cervical back: Normal range of motion and neck supple.   Skin:     General: Skin is warm and dry.      Capillary Refill: Capillary refill takes less than 2 seconds.   Neurological:      General: No focal deficit present.      Mental Status: She is alert and oriented to person, place, and time. Mental status  is at baseline.             Significant Labs: All pertinent labs within the past 24 hours have been reviewed.  CBC:   Recent Labs   Lab 02/25/24  1419 02/27/24  0510   WBC 14.19* 20.32*   HGB 15.3 12.8   HCT 46.1 39.1    305     CMP:   Recent Labs   Lab 02/25/24  1419 02/26/24  0522 02/27/24  0510    141 139   K 3.2* 4.7 4.1    108 108   CO2 22* 23 26    179* 146*   BUN 6* 10 25*   CREATININE 0.8 0.9 0.7   CALCIUM 10.1 10.6* 9.6   PROT 7.7 7.2 6.2   ALBUMIN 4.3 3.9 3.7   BILITOT 0.5 0.3 0.2   ALKPHOS 77 74 52*   AST 18 16 15   ALT 27 24 19   ANIONGAP 14 10 5*       Significant Imaging: I have reviewed all pertinent imaging results/findings within the past 24 hours.

## 2024-02-27 NOTE — ASSESSMENT & PLAN NOTE
Patient with Hypoxic Respiratory failure which is Acute.  she is not on home oxygen. Supplemental oxygen was provided and noted-  pt placed on oxygen in emergency room  Patients O2 tank ran out, sats dropped to 88%, tank changed and O2 increased to 4 LNC, stats now 94%.      Electronically signed by Darling Lovell RN at 2/25/2024  8:11 PM    .   Signs/symptoms of respiratory failure include- tachypnea, increased work of breathing, and wheezing. Contributing diagnoses includes -  Covid-19 and chronic asthma  Labs and images were reviewed. Patient Has not had a recent ABG. Will treat underlying causes and adjust management of respiratory failure as follows- supplemental oxygen, duonebs, steroids  -D-dimer notably negative  -continue the COVID treatment and wean oxygen as able  -suspect the white blood cell count is due to the steroids.

## 2024-02-27 NOTE — PROGRESS NOTES
[facility]  Progress Note    Patient Name: Saloni Jeff  MRN: [id number]  Patient Class: IP- Inpatient   Admission Date: 2/25/2024  Length of Stay: 0 days  Attending Physician: Alisa Harvey MD  Primary Care Provider: Cleo Cruz MD        Subjective:     Principal Problem:COVID-19        HPI:  Saloni Jeff is a 63 year old female with a previous medical history of hyperlipidemia, GERD, asthma with reoccuring bronchitis, iron deficiency anemia and gastric sleeve who presents for worsening SOB at rest and exertional since 2/24/24. Patient currently on day 4 of paxlovid after being diagnosed with covid on 2/21/24. Patient was seen by urgent care on Sunday 2/18/24 because she felt that she was having a flare with a her bronchitis for which she was prescribed antibiotics and steroids that she did not take. The next day she traveled to [place] to visit her mother who was hospitalized with Covid and subsequently passed away. Initial ED evaluation revealed a WBC of 14, ferritin of 10, potassium of 3.2, and lactate dehydrogenase of 379. Chest xray normal. Covid today was negative. Will obtain influenza specimen to rule out and six minute walk test on room air. Patient oxygen saturation noted to have dropped to 91% while in ED with increased work of breathing. Given duonebs and 80mg of solumedrol IV. Upon examination patient 94% on 3 liters nasal cannula at rest. Patient to be admitted by hospital medicine for further evaluation and management     Overview/Hospital Course:  Pt was monitored closely during her stay.  She was maintained on remdesivir and Decadron.  She required ongoing supplemental oxygen.    Interval History:  Pt seen and examined.  Home O2 eval was done and Pt required 3 L to maintain greater than 90% sats.  She complains of inability to loosen secretions.  Mucinex was added.  Hydration was encouraged.  We will continue current treatment plan,  monitor overnight, and if can continue weaning oxygen consideration for discharge tomorrow.    Review of Systems   Constitutional:  Negative for chills and fever.   Respiratory:  Positive for cough, shortness of breath and wheezing.    Cardiovascular:  Negative for chest pain.   Gastrointestinal:  Negative for nausea and vomiting.     Objective:     Vital Signs (Most Recent):  Temp: 97.9 °F (36.6 °C) (02/27/24 1156)  Pulse: 94 (02/27/24 1305)  Resp: 18 (02/27/24 1305)  BP: 123/60 (02/27/24 1156)  SpO2: (!) 94 % (02/27/24 1305) Vital Signs (24h Range):  Temp:  [97.8 °F (36.6 °C)-98.2 °F (36.8 °C)] 97.9 °F (36.6 °C)  Pulse:  [62-94] 94  Resp:  [16-18] 18  SpO2:  [91 %-99 %] 94 %  BP: (110-131)/(53-61) 123/60     Weight: 78.9 kg (173 lb 15.1 oz)  Body mass index is 28.08 kg/m².    Intake/Output Summary (Last 24 hours) at 2/27/2024 1324  Last data filed at 2/26/2024 1910  Gross per 24 hour   Intake 320 ml   Output --   Net 320 ml         Physical Exam  Vitals and nursing note reviewed.   Constitutional:       Appearance: Normal appearance.   Cardiovascular:      Rate and Rhythm: Normal rate and regular rhythm.   Pulmonary:      Breath sounds: Wheezing present.      Comments: Comfortable on 3 L oxygen  Abdominal:      General: Abdomen is flat. Bowel sounds are normal.      Palpations: Abdomen is soft.   Musculoskeletal:         General: Normal range of motion.      Cervical back: Normal range of motion and neck supple.   Skin:     General: Skin is warm and dry.      Capillary Refill: Capillary refill takes less than 2 seconds.   Neurological:      General: No focal deficit present.      Mental Status: She is alert and oriented to person, place, and time. Mental status is at baseline.             Significant Labs: All pertinent labs within the past 24 hours have been reviewed.  CBC:   Recent Labs   Lab 02/25/24  1419 02/27/24  0510   WBC 14.19* 20.32*   HGB 15.3 12.8   HCT 46.1 39.1    305     CMP:   Recent Labs    Lab 02/25/24  1419 02/26/24  0522 02/27/24  0510    141 139   K 3.2* 4.7 4.1    108 108   CO2 22* 23 26    179* 146*   BUN 6* 10 25*   CREATININE 0.8 0.9 0.7   CALCIUM 10.1 10.6* 9.6   PROT 7.7 7.2 6.2   ALBUMIN 4.3 3.9 3.7   BILITOT 0.5 0.3 0.2   ALKPHOS 77 74 52*   AST 18 16 15   ALT 27 24 19   ANIONGAP 14 10 5*       Significant Imaging: I have reviewed all pertinent imaging results/findings within the past 24 hours.    Assessment/Plan:      * COVID-19  Patient is identified as Severe COVID-19 based on hypoxemia with O2 saturations <94% on room air or on ambulation   Initiate standard COVID protocols; COVID-19 testing ,Infection Control notification  and isolation- respiratory, contact and droplet per protocol    Diagnostics: CBC, CMP, Ferritin, CRP, LDH, BNP, ECG, Rapid Flu, Blood Culture x2, and Portable CXR, lactic acid, CPK. PT/INR, SED rate    Management: Initiate targeted therapy with Remdesivir, 200mg IV x1, followed by 100mg IV daily x5 days total, Dexamethasone PO/IV 6mg daily x10 days, and Anticoagulation, Patient admitted to critical care- Will initiate prophylactic dose anticoagulation, Maintain oxygen saturations 92-96% via Nasal Cannula 3 (portable) LPM and monitor with continuous/intermittent pulse oximetry. , nebulized bronchodilators as needed Q 6 hours for shortness of breath., and Continuous cardiac monitoring.    Advance Care Planning Current advance care plan has not been discussed with patient/family/POA and patient currently wishes Full Code.     Acute respiratory failure with hypoxia  Patient with Hypoxic Respiratory failure which is Acute.  she is not on home oxygen. Supplemental oxygen was provided and noted-  pt placed on oxygen in emergency room  Patients O2 tank ran out, sats dropped to 88%, tank changed and O2 increased to 4 LNC, stats now 94%.      Electronically signed by Darling Lovell RN at 2/25/2024  8:11 PM    .   Signs/symptoms of respiratory failure  include- tachypnea, increased work of breathing, and wheezing. Contributing diagnoses includes -  Covid-19 and chronic asthma  Labs and images were reviewed. Patient Has not had a recent ABG. Will treat underlying causes and adjust management of respiratory failure as follows- supplemental oxygen, duonebs, steroids  -D-dimer notably negative  -continue the COVID treatment and wean oxygen as able  -suspect the white blood cell count is due to the steroids.    Unspecified chronic bronchitis  Pt with background Hx of chronic bronchitis now with COVID with significant symptomatology and hypoxia   -continue COVID treatment, supportive care with dexamethasone, remdesivir, bronchodilators   -continue supplemental oxygen and wean as able      Gastroesophageal reflux disease without esophagitis  Chronic problem noted will continue PPI daily        VTE Risk Mitigation (From admission, onward)           Ordered     enoxaparin injection 40 mg  Every 24 hours         02/25/24 1737                    Discharge Planning   NOE: 2/29/2024     Code Status: Full Code   Is the patient medically ready for discharge?:     Reason for patient still in hospital (select all that apply): Patient trending condition and Treatment  Discharge Plan A: Home with family                  Selam Almeida NP  Department of Hospital Medicine   [facility]

## 2024-02-27 NOTE — PLAN OF CARE
Patient still requiring home oxygen - CM following     02/27/24 0823   Discharge Reassessment   Assessment Type Discharge Planning Reassessment   Did the patient's condition or plan change since previous assessment? Yes   Communicated NOE with patient/caregiver Yes   Discharge Plan A Home with family   Why the patient remains in the hospital Requires continued medical care

## 2024-02-27 NOTE — CARE UPDATE
Continue tx   02/27/24 0733   Patient Assessment/Suction   Level of Consciousness (AVPU) alert   Respiratory Effort Normal;Unlabored   Expansion/Accessory Muscles/Retractions no use of accessory muscles;expansion symmetric   All Lung Fields Breath Sounds coarse;wheezes, expiratory   SRIRAM Breath Sounds coarse;wheezes, expiratory   LLL Breath Sounds diminished   RUL Breath Sounds wheezes, expiratory   RML Breath Sounds diminished   RLL Breath Sounds diminished   Rhythm/Pattern, Respiratory unlabored;depth regular   Cough Frequency infrequent   Cough Type nonproductive   PRE-TX-O2   Device (Oxygen Therapy) nasal cannula   $ Is the patient on Low Flow Oxygen? Yes   Flow (L/min) 2   SpO2 (!) 94 %   Pulse Oximetry Type Continuous   $ Pulse Oximetry - Multiple Charge Pulse Oximetry - Multiple   Pulse 84   Resp 16   Aerosol Therapy   $ Aerosol Therapy Charges Aerosol Treatment   Daily Review of Necessity (SVN) completed   Respiratory Treatment Status (SVN) given   Treatment Route (SVN) mask;oxygen   Patient Position (SVN) HOB elevated   Post Treatment Assessment (SVN) increased aeration   Signs of Intolerance (SVN) none   Breath Sounds Post-Respiratory Treatment   Throughout All Fields Post-Treatment All Fields   Throughout All Fields Post-Treatment aeration increased   Post-treatment Heart Rate (beats/min) 88   Post-treatment Resp Rate (breaths/min) 18

## 2024-02-27 NOTE — HOSPITAL COURSE
Pt was monitored closely during her stay.  Patient required supplemental oxygen initially upon admission to the hospital.  Patient was started remdesivir and Decadron in the ED.  Patient later tested negative for COVID.  CTA chest revealed bilateral patchy groundglass opacities suggestive of atypical viral pneumonia. Patient ambulating in room without difficulty.  Patient is stable room air at time of discharge.  Patient ambulated with nursing room and was able to maintain saturation of 94%.  Patient endorses that she does use vape daily.  Patient was educated on the need to discontinue the use of vape as it could exacerbate her symptoms.  A prescription for albuterol, Mucinex, and steroids were sent to patient's preferred pharmacy.    Patient deemed appropriate for discharge.  Patient is to follow with primary care provider in 1 week.  Patient and family educated on discharge planning, both verbalized understanding.  Patient advised to return to the emergency department if symptoms return or worsen.  Patient advised to purchase a pulse oximeter from pharmacy and monitor oxygen level if oxygen level drops below 92% she is returned to the emergency department.

## 2024-02-27 NOTE — CARE UPDATE
02/27/24 0815   PRE-TX-O2   Device (Oxygen Therapy) room air   SpO2 (!) 91 %   Home Oxygen Qualification   $ Home O2 Qualification Tech time 15 minutes   Room Air SpO2 At Rest 91 %   Room Air SpO2 During Ambulation (!) 87 %   SpO2 During Ambulation on O2 90 %   Heart Rate on O2 92 bpm   Ambulation O2 LPM 3 LPM   SpO2 Post Ambulation 92 %   Post Ambulation Heart Rate 78 bpm   Post Ambulation O2 LPM 3 LPM   Home O2 Eval Comments   (RA SATS 91, AMBULATION NEEDS 3L TO ACHEIVE 90% , ON ILNC REMAINED 87, ON 2LNC 88%)

## 2024-02-28 VITALS
SYSTOLIC BLOOD PRESSURE: 117 MMHG | OXYGEN SATURATION: 94 % | HEART RATE: 87 BPM | HEIGHT: 66 IN | WEIGHT: 173.94 LBS | TEMPERATURE: 99 F | DIASTOLIC BLOOD PRESSURE: 56 MMHG | BODY MASS INDEX: 27.95 KG/M2 | RESPIRATION RATE: 17 BRPM

## 2024-02-28 LAB
ALBUMIN SERPL BCP-MCNC: 3.6 G/DL (ref 3.5–5.2)
ALP SERPL-CCNC: 47 U/L (ref 55–135)
ALT SERPL W/O P-5'-P-CCNC: 21 U/L (ref 10–44)
ANION GAP SERPL CALC-SCNC: 9 MMOL/L (ref 8–16)
AST SERPL-CCNC: 15 U/L (ref 10–40)
BILIRUB SERPL-MCNC: 0.3 MG/DL (ref 0.1–1)
BUN SERPL-MCNC: 20 MG/DL (ref 8–23)
CALCIUM SERPL-MCNC: 9 MG/DL (ref 8.7–10.5)
CHLORIDE SERPL-SCNC: 104 MMOL/L (ref 95–110)
CO2 SERPL-SCNC: 24 MMOL/L (ref 23–29)
CREAT SERPL-MCNC: 0.7 MG/DL (ref 0.5–1.4)
CRP SERPL-MCNC: 14.5 MG/L (ref 0–8.2)
EST. GFR  (NO RACE VARIABLE): >60 ML/MIN/1.73 M^2
GLUCOSE SERPL-MCNC: 128 MG/DL (ref 70–110)
MAGNESIUM SERPL-MCNC: 2.2 MG/DL (ref 1.6–2.6)
PHOSPHATE SERPL-MCNC: 2.7 MG/DL (ref 2.7–4.5)
POTASSIUM SERPL-SCNC: 4.1 MMOL/L (ref 3.5–5.1)
PROT SERPL-MCNC: 6 G/DL (ref 6–8.4)
SODIUM SERPL-SCNC: 137 MMOL/L (ref 136–145)

## 2024-02-28 PROCEDURE — 99900035 HC TECH TIME PER 15 MIN (STAT)

## 2024-02-28 PROCEDURE — 94640 AIRWAY INHALATION TREATMENT: CPT

## 2024-02-28 PROCEDURE — 27000221 HC OXYGEN, UP TO 24 HOURS

## 2024-02-28 PROCEDURE — 25000003 PHARM REV CODE 250: Performed by: NURSE PRACTITIONER

## 2024-02-28 PROCEDURE — 36415 COLL VENOUS BLD VENIPUNCTURE: CPT

## 2024-02-28 PROCEDURE — 84100 ASSAY OF PHOSPHORUS: CPT

## 2024-02-28 PROCEDURE — 80053 COMPREHEN METABOLIC PANEL: CPT

## 2024-02-28 PROCEDURE — 25000003 PHARM REV CODE 250

## 2024-02-28 PROCEDURE — 86140 C-REACTIVE PROTEIN: CPT

## 2024-02-28 PROCEDURE — 94618 PULMONARY STRESS TESTING: CPT

## 2024-02-28 PROCEDURE — 25000242 PHARM REV CODE 250 ALT 637 W/ HCPCS: Performed by: STUDENT IN AN ORGANIZED HEALTH CARE EDUCATION/TRAINING PROGRAM

## 2024-02-28 PROCEDURE — 99900031 HC PATIENT EDUCATION (STAT)

## 2024-02-28 PROCEDURE — 83735 ASSAY OF MAGNESIUM: CPT

## 2024-02-28 PROCEDURE — 25500020 PHARM REV CODE 255: Performed by: HOSPITALIST

## 2024-02-28 PROCEDURE — 63600175 PHARM REV CODE 636 W HCPCS: Mod: JZ,TB

## 2024-02-28 PROCEDURE — 94761 N-INVAS EAR/PLS OXIMETRY MLT: CPT

## 2024-02-28 RX ORDER — ASCORBIC ACID 500 MG
500 TABLET ORAL 2 TIMES DAILY
Qty: 14 TABLET | Refills: 0 | Status: SHIPPED | OUTPATIENT
Start: 2024-02-28 | End: 2024-03-06

## 2024-02-28 RX ORDER — ALBUTEROL SULFATE 0.83 MG/ML
2.5 SOLUTION RESPIRATORY (INHALATION) 4 TIMES DAILY
Qty: 84 ML | Refills: 1 | Status: SHIPPED | OUTPATIENT
Start: 2024-02-28 | End: 2024-03-13

## 2024-02-28 RX ORDER — DEXAMETHASONE 6 MG/1
6 TABLET ORAL DAILY
Qty: 10 TABLET | Refills: 0 | Status: SHIPPED | OUTPATIENT
Start: 2024-02-29 | End: 2024-03-10

## 2024-02-28 RX ORDER — GUAIFENESIN 600 MG/1
600 TABLET, EXTENDED RELEASE ORAL 2 TIMES DAILY
Qty: 20 TABLET | Refills: 0 | Status: SHIPPED | OUTPATIENT
Start: 2024-02-28 | End: 2024-03-09

## 2024-02-28 RX ADMIN — OXYCODONE HYDROCHLORIDE AND ACETAMINOPHEN 500 MG: 500 TABLET ORAL at 08:02

## 2024-02-28 RX ADMIN — IPRATROPIUM BROMIDE AND ALBUTEROL SULFATE 3 ML: 2.5; .5 SOLUTION RESPIRATORY (INHALATION) at 12:02

## 2024-02-28 RX ADMIN — IOHEXOL 100 ML: 350 INJECTION, SOLUTION INTRAVENOUS at 10:02

## 2024-02-28 RX ADMIN — GUAIFENESIN 600 MG: 600 TABLET, EXTENDED RELEASE ORAL at 08:02

## 2024-02-28 RX ADMIN — GABAPENTIN 300 MG: 300 CAPSULE ORAL at 08:02

## 2024-02-28 RX ADMIN — REMDESIVIR 100 MG: 100 INJECTION, POWDER, LYOPHILIZED, FOR SOLUTION INTRAVENOUS at 10:02

## 2024-02-28 RX ADMIN — DEXAMETHASONE 6 MG: 2 TABLET ORAL at 08:02

## 2024-02-28 RX ADMIN — IPRATROPIUM BROMIDE AND ALBUTEROL SULFATE 3 ML: 2.5; .5 SOLUTION RESPIRATORY (INHALATION) at 08:02

## 2024-02-28 RX ADMIN — MULTIVITAMIN TABLET 1 TABLET: TABLET at 08:02

## 2024-02-28 RX ADMIN — IPRATROPIUM BROMIDE AND ALBUTEROL SULFATE 3 ML: 2.5; .5 SOLUTION RESPIRATORY (INHALATION) at 01:02

## 2024-02-28 NOTE — CARE UPDATE
02/28/24 0810   Patient Assessment/Suction   Level of Consciousness (AVPU) alert   Respiratory Effort Normal;Unlabored   Expansion/Accessory Muscles/Retractions no use of accessory muscles   All Lung Fields Breath Sounds rhonchi   Rhythm/Pattern, Respiratory unlabored   Cough Frequency infrequent   Cough Type good;congested   PRE-TX-O2   Device (Oxygen Therapy) nasal cannula  (WAS ON PRIOR TO HOME O2 EVAL. REMOVED FOR TEST AND ON S/B)   $ Is the patient on Low Flow Oxygen? Yes   Flow (L/min) 2   SpO2 95 %   Pulse Oximetry Type Continuous   $ Pulse Oximetry - Multiple Charge Pulse Oximetry - Multiple   Pulse 75   Resp 16   Aerosol Therapy   $ Aerosol Therapy Charges Aerosol Treatment   Daily Review of Necessity (SVN) completed   Respiratory Treatment Status (SVN) given   Treatment Route (SVN) mouthpiece;oxygen   Patient Position (SVN) HOB elevated   Post Treatment Assessment (SVN) increased aeration;vital signs unchanged   Signs of Intolerance (SVN) none   Respiratory Evaluation   $ Care Plan Tech Time 15 min

## 2024-02-28 NOTE — PLAN OF CARE
Discharge orders and chart reviewed. No other discharge needs noted at this time. Pt is clear for discharge from case management, after seen by hospital medicine. Pt is discharging to home.    Follow up with PCP scheduled and added to AVS     02/28/24 1059   Final Note   Assessment Type Final Discharge Note   Anticipated Discharge Disposition Home   What phone number can be called within the next 1-3 days to see how you are doing after discharge? [phone]   Hospital Resources/Appts/Education Provided Appointments scheduled and added to AVS;Appointment suggestion unavailable   Post-Acute Status   Discharge Delays (!) Waiting for Provider to Speak to Patient

## 2024-02-28 NOTE — CARE UPDATE
02/28/24 0759   Education   $ Education DME Oxygen   Home Oxygen Qualification   $ Home O2 Qualification Pulmonary Stress Test/6 min walk;Tech time 15 minutes   Room Air SpO2 At Rest 92 %   Room Air SpO2 During Ambulation 90 %   SpO2 Post Ambulation 91 %   Post Ambulation Heart Rate 88 bpm   Home O2 Eval Comments DOES NOT QUALIFY FOR O2, SATS REMAINED >88%   Respiratory Evaluation   $ Care Plan Tech Time 15 min

## 2024-02-29 NOTE — DISCHARGE INSTRUCTIONS
Please review attached patient instructions.  Please keep all follow up appointments. Take medications as prescribed.    You may leave home and/or return to work when the following conditions are met:  24 hours fever free without fever-reducing medications AND  Improved symptoms  You are fully vaccinated or have not had close contact with someone with COVID-19 (within 6 feet for 15 minutes or more)    If you are fully vaccinated and had a close contact, there is no need for quarantine, unless you develop symptoms.      If you are not fully vaccinated and had a close contact:  Retest at 5 to 7 days post-exposure  If possible, it is recommended that you quarantine for 5 days from the time of contact regardless of your test status.  A mask should be worn indoors post quarantine.

## 2024-02-29 NOTE — DISCHARGE SUMMARY
[facility] Medicine  Discharge Summary      Patient Name: Saloni Jeff  MRN: [id number]  OLIVIER: [id number]  Patient Class: IP- Inpatient  Admission Date: 2/25/2024  Hospital Length of Stay: 1 days  Discharge Date and Time: 2/28/2024  7:01 PM  Attending Physician: Alisa Harvey MD   Discharging Provider: Marcelino Bedoya NP  Primary Care Provider: Cleo Cruz MD    Primary Care Team: Networked reference to record PCT     HPI:   Saloni Jeff is a 63 year old female with a previous medical history of hyperlipidemia, GERD, asthma with reoccuring bronchitis, iron deficiency anemia and gastric sleeve who presents for worsening SOB at rest and exertional since 2/24/24. Patient currently on day 4 of paxlovid after being diagnosed with covid on 2/21/24. Patient was seen by urgent care on Sunday 2/18/24 because she felt that she was having a flare with a her bronchitis for which she was prescribed antibiotics and steroids that she did not take. The next day she traveled to [place] to visit her mother who was hospitalized with Covid and subsequently passed away. Initial ED evaluation revealed a WBC of 14, ferritin of 10, potassium of 3.2, and lactate dehydrogenase of 379. Chest xray normal. Covid today was negative. Will obtain influenza specimen to rule out and six minute walk test on room air. Patient oxygen saturation noted to have dropped to 91% while in ED with increased work of breathing. Given duonebs and 80mg of solumedrol IV. Upon examination patient 94% on 3 liters nasal cannula at rest. Patient to be admitted by hospital medicine for further evaluation and management     * No surgery found *      Hospital Course:   Pt was monitored closely during her stay.  Patient required supplemental oxygen initially upon admission to the hospital.  Patient was started remdesivir and Decadron in the ED.  Patient later tested negative for COVID.  CTA chest revealed bilateral  patchy groundglass opacities suggestive of atypical viral pneumonia. Patient ambulating in room without difficulty.  Patient is stable room air at time of discharge.  Patient ambulated with nursing room and was able to maintain saturation of 94%.  Patient endorses that she does use vape daily.  Patient was educated on the need to discontinue the use of vape as it could exacerbate her symptoms.  A prescription for albuterol, Mucinex, and steroids were sent to patient's preferred pharmacy.    Patient deemed appropriate for discharge.  Patient is to follow with primary care provider in 1 week.  Patient and family educated on discharge planning, both verbalized understanding.  Patient advised to return to the emergency department if symptoms return or worsen.  Patient advised to purchase a pulse oximeter from pharmacy and monitor oxygen level if oxygen level drops below 92% she is returned to the emergency department.     Goals of Care Treatment Preferences:  Code Status: Full Code      Consults:     No new Assessment & Plan notes have been filed under this hospital service since the last note was generated.  Service: Hospital Medicine    Final Active Diagnoses:    Diagnosis Date Noted POA    PRINCIPAL PROBLEM:  COVID-19 [U07.1] 02/25/2024 Yes    Unspecified chronic bronchitis [J42] 02/25/2024 Yes    Acute respiratory failure with hypoxia [J96.01] 02/25/2024 Yes    Gastroesophageal reflux disease without esophagitis [K21.9] 10/23/2023 Yes      Problems Resolved During this Admission:    Diagnosis Date Noted Date Resolved POA    Hypokalemia [E87.6] 02/25/2024 02/26/2024 Yes       Discharged Condition: good    Disposition: Home or Self Care    Follow Up:   Follow-up Information       Cleo Cruz MD. Go on 3/5/2024.    Specialties: Hospitalist, Internal Medicine  Why: hospital follow up apt scheduled 11:20 AM  Contact information:  [address]  [address]  [facility] [address]  [phone]                            Patient Instructions:      Notify your health care provider if you experience any of the following:  temperature >100.4     Notify your health care provider if you experience any of the following:  persistent dizziness, light-headedness, or visual disturbances     Notify your health care provider if you experience any of the following:  difficulty breathing or increased cough     Activity as tolerated       Significant Diagnostic Studies: Labs: CMP   Recent Labs   Lab 02/27/24  0510 02/28/24  0800    137   K 4.1 4.1    104   CO2 26 24   * 128*   BUN 25* 20   CREATININE 0.7 0.7   CALCIUM 9.6 9.0   PROT 6.2 6.0   ALBUMIN 3.7 3.6   BILITOT 0.2 0.3   ALKPHOS 52* 47*   AST 15 15   ALT 19 21   ANIONGAP 5* 9    and CBC   Recent Labs   Lab 02/27/24  0510   WBC 20.32*   HGB 12.8   HCT 39.1      .covid    Pending Diagnostic Studies:       None           Medications:  Reconciled Home Medications:      Medication List        START taking these medications      ascorbic acid (vitamin C) 500 MG tablet  Commonly known as: VITAMIN C  Take 1 tablet (500 mg total) by mouth 2 (two) times daily. for 7 days     dexAMETHasone 6 MG tablet  Commonly known as: DECADRON  Take 1 tablet (6 mg total) by mouth once daily. for 10 days  Start taking on: February 29, 2024     guaiFENesin 600 mg 12 hr tablet  Commonly known as: MUCINEX  Take 1 tablet (600 mg total) by mouth 2 (two) times daily. for 10 days            CONTINUE taking these medications      * albuterol 90 mcg/actuation inhaler  Commonly known as: PROVENTIL/VENTOLIN HFA  Inhale 2 puffs into the lungs 4 (four) times daily as needed.     * albuterol 2.5 mg /3 mL (0.083 %) nebulizer solution  Commonly known as: PROVENTIL  Take 3 mLs (2.5 mg total) by nebulization 4 (four) times daily. for 14 days     gabapentin 300 MG capsule  Commonly known as: NEURONTIN  Take 300 mg by mouth 2 (two) times daily.     omeprazole 40 MG capsule  Commonly known as:  PRILOSEC  Take 40 mg by mouth every morning.     PAXLOVID 300 mg (150 mg x 2)-100 mg copackaged tablets (EUA)  Generic drug: nirmatrelvir-ritonavir  Take by mouth.     POLYTUSSIN DM(PYRILAMINE) 12.5-5-7.5 mg/5 mL Liqd  Generic drug: pyrilamine-phenylephrine-DM  Take 7 mLs by mouth 3 (three) times daily as needed.     RYALTRIS 665-25 mcg/spray [place]ml[place]  Generic drug: olopatadine-mometasone  2 sprays by Nasal route 2 (two) times a day.     simvastatin 20 MG tablet  Commonly known as: ZOCOR  Take 20 mg by mouth every evening.     sucralfate 1 gram tablet  Commonly known as: CARAFATE  Take 1 g by mouth 3 (three) times daily.     zolpidem 10 mg Tab  Commonly known as: AMBIEN  Take 1 tablet by mouth every evening.           * This list has 2 medication(s) that are the same as other medications prescribed for you. Read the directions carefully, and ask your doctor or other care provider to review them with you.                  Indwelling Lines/Drains at time of discharge:   Lines/Drains/Airways       None                   Time spent on the discharge of patient: 35 minutes         Marcelino Bedoya NP  Department of Hospital Medicine  [facility]

## 2024-02-29 NOTE — PHYSICIAN QUERY
PT Name: Saloni Jeff  MR #: [id number]     DOCUMENTATION CLARIFICATION      CDS/: Shaye Gan               Contact information:    This form is a permanent document in the medical record.     Query Date: February 29, 2024    Dear Provider,  By submitting this query, we are merely seeking further clarification of documentation.  Please utilize your independent clinical judgment when addressing the question(s) below.     The Medical Record contains the following:    Supporting Clinical Findings Location in Medical Record   Patient required supplemental oxygen initially upon admission to the hospital. Patient was started remdesivir and Decadron in the ED. Patient later tested negative for COVID. CTA chest revealed bilateral patchy groundglass opacities suggestive of atypical viral pneumonia  Discharge Summary     Please clarify if the COVID 19 diagnosis has been:    [  ] COVID-19 only Ruled In   [  ] COVID-19 w/COVID Pneumonia Ruled In   [  ] COVID-19 w/Pneumonia (please specify type):   [  ] COVID-19 Ruled Out, Pneumonia Ruled in (please specify type):   [  ] COVID-19 & Pneumonia Ruled Out   [ x ] Other/Clarification of findings (please specify):   Patient had a previous diagnosis for COVID pneumonia which was treated outpatient.  Patient came to the emergency department with shortness of breath hypoxia.  X-ray revealed bilateral patchy ground-glass opacities which is congruent with diagnosis of COVID.  Patient endorsed that she continue to fade while experiencing symptoms of COVID pneumonia which likely exacerbated patient's symptoms.

## 2024-02-29 NOTE — NURSING
Patient discharged per orders, all instructions reviewed with patient and patient verbalized understanding.  IV discontinued x 1, Tele box discontinued x 1.  Patient transferred to wheelchair x 1, all belongings by patients side.  Patient's spouse to transport her home.

## 2024-03-02 LAB
OHS QRS DURATION: 92 MS
OHS QTC CALCULATION: 457 MS

## 2024-04-04 DIAGNOSIS — Z78.0 MENOPAUSE: Primary | ICD-10-CM

## 2024-04-16 ENCOUNTER — HOSPITAL ENCOUNTER (OUTPATIENT)
Dept: RADIOLOGY | Facility: HOSPITAL | Age: 64
Discharge: HOME | End: 2024-04-16
Attending: SPECIALIST
Payer: COMMERCIAL

## 2024-04-16 DIAGNOSIS — Z78.0 MENOPAUSE: ICD-10-CM

## 2024-04-16 DIAGNOSIS — Z12.31 VISIT FOR SCREENING MAMMOGRAM: ICD-10-CM

## 2024-04-16 PROCEDURE — 77063 BREAST TOMOSYNTHESIS BI: CPT | Mod: 26,,, | Performed by: RADIOLOGY

## 2024-04-16 PROCEDURE — 77067 SCR MAMMO BI INCL CAD: CPT | Mod: 26,,, | Performed by: RADIOLOGY

## 2024-04-16 PROCEDURE — 77080 DXA BONE DENSITY AXIAL: CPT | Mod: 26,,, | Performed by: RADIOLOGY

## 2024-04-16 PROCEDURE — 77063 BREAST TOMOSYNTHESIS BI: CPT | Mod: TC,PO

## 2024-04-16 PROCEDURE — 77080 DXA BONE DENSITY AXIAL: CPT | Mod: TC,PO

## 2024-04-16 PROCEDURE — 77067 SCR MAMMO BI INCL CAD: CPT | Mod: TC,PO

## 2024-05-27 PROBLEM — J96.01 ACUTE RESPIRATORY FAILURE WITH HYPOXIA: Status: RESOLVED | Noted: 2024-02-25 | Resolved: 2024-05-27

## 2025-03-12 ENCOUNTER — LAB VISIT (OUTPATIENT)
Dept: LAB | Facility: HOSPITAL | Age: 65
End: 2025-03-12
Attending: INTERNAL MEDICINE
Payer: COMMERCIAL

## 2025-03-12 DIAGNOSIS — K22.11 ULCER OF ESOPHAGUS WITH BLEEDING: ICD-10-CM

## 2025-03-12 DIAGNOSIS — K22.4 ESOPHAGEAL DYSMOTILITY: ICD-10-CM

## 2025-03-12 DIAGNOSIS — K20.91 HEMORRHAGIC ESOPHAGITIS: Primary | ICD-10-CM

## 2025-03-12 DIAGNOSIS — K22.4 DYSKINESIA OF ESOPHAGUS: ICD-10-CM

## 2025-03-12 DIAGNOSIS — K20.91 ESOPHAGITIS, UNSPECIFIED WITH BLEEDING: Primary | ICD-10-CM

## 2025-03-12 DIAGNOSIS — K20.91 HEMORRHAGIC ESOPHAGITIS: ICD-10-CM

## 2025-03-12 DIAGNOSIS — K21.9 ESOPHAGEAL REFLUX: ICD-10-CM

## 2025-03-12 DIAGNOSIS — K22.11 ESOPHAGEAL ULCER WITH BLEEDING: ICD-10-CM

## 2025-03-12 DIAGNOSIS — K21.9 GERD (GASTROESOPHAGEAL REFLUX DISEASE): ICD-10-CM

## 2025-03-12 PROCEDURE — 86235 NUCLEAR ANTIGEN ANTIBODY: CPT | Performed by: INTERNAL MEDICINE

## 2025-03-12 PROCEDURE — 36415 COLL VENOUS BLD VENIPUNCTURE: CPT | Performed by: INTERNAL MEDICINE

## 2025-03-14 LAB — ENA SCL70 AB SER-ACNC: <0.2 AI (ref 0–0.9)

## 2025-03-18 ENCOUNTER — HOSPITAL ENCOUNTER (OUTPATIENT)
Dept: RADIOLOGY | Facility: HOSPITAL | Age: 65
Discharge: HOME | End: 2025-03-18
Attending: INTERNAL MEDICINE
Payer: COMMERCIAL

## 2025-03-18 DIAGNOSIS — K20.91 ESOPHAGITIS, UNSPECIFIED WITH BLEEDING: ICD-10-CM

## 2025-03-18 DIAGNOSIS — K22.4 ESOPHAGEAL DYSMOTILITY: ICD-10-CM

## 2025-03-18 DIAGNOSIS — K22.11 ESOPHAGEAL ULCER WITH BLEEDING: ICD-10-CM

## 2025-03-18 DIAGNOSIS — K21.9 GERD (GASTROESOPHAGEAL REFLUX DISEASE): ICD-10-CM

## 2025-03-18 PROCEDURE — 74220 X-RAY XM ESOPHAGUS 1CNTRST: CPT | Mod: TC
